# Patient Record
Sex: FEMALE | Race: WHITE
[De-identification: names, ages, dates, MRNs, and addresses within clinical notes are randomized per-mention and may not be internally consistent; named-entity substitution may affect disease eponyms.]

---

## 2017-01-23 ENCOUNTER — HOSPITAL ENCOUNTER (OUTPATIENT)
Dept: HOSPITAL 22 - RAD | Age: 42
End: 2017-01-23
Attending: FAMILY MEDICINE
Payer: COMMERCIAL

## 2017-01-23 DIAGNOSIS — R10.31: Primary | ICD-10-CM

## 2017-01-23 NOTE — RADIOLOGY REPORT PS360
US PELVIS-TRANSVAGINAL ONLY
 
ORDERING PHYSICIAN :  SHIRLENE Caputo MD
PATIENT AGE:  41 years  GENDER:  Female
 
INDICATION: PLQ PAIN
 RLQ pain vomiting diarrhea 1 day. Patient deposition.
 
TECHNIQUE: Transvaginal pelvic ultrasound
COMPARISON: Previous CT abdomen and pelvis 10/17/2013
 
FINDINGS 
 
UTERUS.: Normal size 6.4 cm length x2.8 x 3.6 cm wide. No fibroids. 
Tiny echogenic focus along endometrial stripe at body of uterus.- Nonspecific
may reflect small calcification or tiny polyp possibly.
Endometrial stripe however appears fairly normal measuring 4.5 mm AP but may
become slightly more generous towards cervix.. No fluid at cul-de-sac.
 
 
 Right Ovary 1.9 cm x 2.8  cm x 2 cm with transabdominal imaging. . Small
follicle 9 mm size. Vaguely evident
Difficult to visualize right ovary. Was scanned both transvaginal and trans
abdominal in order to survey right adnexa as well as right lower quadrant. Right
ovary Measurements are slightly larger with the transabdominal scanning versus
Tv.
 
Left Ovary larger than right
Left ovary: 2.9 cm x 1.8 cm x 1.53 cm
Follicular Cysts at left ovary: Largest measuring nearly 1.2 cm x 0.8 cm
posteriorly. Another more anteriorly measuring 1.1 cm 
 
 
IMPRESSION: -------------
 
Ovaries appear relatively small/normal size bilateral with bilateral follicular
cysts. 
More posterior right ovary more difficult to visualize
 
Right adnexa & RLQ image image with transvaginal and transabdominal scanning. 
No additional findings seen RLQ with limited ultrasound survey
 
No fluid in cul-de-sac more towards right adnexa.
 
 Uterus appears normal in size.
Small echogenic focus endometrium mid right uterus reflect small calcification.
Doubt tiny polyp

## 2017-01-23 NOTE — RADIOLOGY REPORT PS360
US ABD(COMPLETE-MULTI ORGANS**
 
ORDERING PHYSICIAN :  SHIRLENE Caputo MD
PATIENT AGE:  41 years  GENDER:  Female
 
INDICATION: PLQ PAIN
 Nausea and vomiting right lower quadrant pain
 
TECHNIQUE: ] Ultrasound abdomen, complete
 
COMPARISON: CT abdomen pelvis 10/17/2013
 
FINDINGS 
Pancreas unremarkable
 
Liver. No focal lesions. No bili ductal dilatation.
Portal vein normal direction flow. Normal size.
 
Gallbladder. Surgically absent
Common duct measuring up to 6 mm just inferior to the hilum of liver. Upper
normal caliber likely reflecting previous cholecystectomy changes.
 
Kidneys appear normal size and appearance bilaterally. Cortex well-maintained
bilateral
. No hydronephrosis nor mass. Either kidney
Right kidney. 9.6 cm length
Left kidney 9.8 cm length
 
Spleen. Normal size unremarkable. 9.5 cm length.
 
Aorta normal caliber. No aneurysm.
 It measures up to 1.6 cm proximal and then Tapers as it continues distal
 
IMPRESSION--------
 
Unremarkable ultrasound upper abdomen.
No abnormalities identified
 
Gallbladder surgically removed. 
Slight Generous common duct-just over 7 mm size reflecting previous
cholecystectomy
 
Kidneys unremarkable

## 2017-01-24 ENCOUNTER — HOSPITAL ENCOUNTER (EMERGENCY)
Dept: HOSPITAL 22 - ER | Age: 42
Discharge: HOME | End: 2017-01-24
Payer: COMMERCIAL

## 2017-01-24 VITALS — BODY MASS INDEX: 34.55 KG/M2 | WEIGHT: 215 LBS | HEIGHT: 66 IN

## 2017-01-24 VITALS — SYSTOLIC BLOOD PRESSURE: 128 MMHG | DIASTOLIC BLOOD PRESSURE: 74 MMHG

## 2017-01-24 DIAGNOSIS — I10: ICD-10-CM

## 2017-01-24 DIAGNOSIS — R19.7: Primary | ICD-10-CM

## 2017-01-24 DIAGNOSIS — E87.6: ICD-10-CM

## 2017-01-24 LAB
ALBUMIN/GLOB SERPL: (no result) {RATIO}
BASOPHILS # BLD AUTO: 1.2 K/MM3 (ref 0.7–4.5)
BUN: 18 MG/DL (ref 7–18)
EOSINOPHIL NFR BLD AUTO: 19.5 % (ref 10–50)
GFR SERPLBLD BASED ON 1.73 SQ M-ARVRAT: 69 ML/MIN (ref 59–?)
HCT VFR BLD CALC: 16 G/DL (ref 12.2–16.2)
SQUAMOUS #/AREA URNS HPF: (no result) #/HPF (ref 0–5)
URINE BILIRUBIN - DIPSTICK: NEGATIVE

## 2017-01-24 NOTE — EMERGENCY ROOM REPORT
History of Present Illness
Time Seen by MD [facility]
Presenting Problem in Triage
Pt arrived:Walked    
Presenting Problem:RIGHT SIDE PAIN WITH DIARRHEA FOR 48 HOURS 
Onset of symptoms date/time:01/2222/17/0800 or onset unknown for:
Treatment Prior to Arrival:    PTA Provided by:
 
Sepsis Risk Assessment: 
Temp: 98.4 B/P: 127/90 MAP: 102 Pulse: 138 Resp: 18
Recent fever? N Clinical Suspician of Infection? N 
Mental Status: 1 - Regular (Normal Baseline)   Sepsis Risk:Low Sepsis Risk 
 
Have you (or family members/close friends) recently traveled outside the [place]? N
If Yes, where/when: 
Have you had exposure to infectious disease within the past month? N TB? 
Other?  Specify: 
Watery stools for the last 2.5 days, started on Doxy for acne ten days ago. No 
fever. No blood from above or below. No sick contacts. Cramping in periumbilical
area and RUQ and RLQ with negative US done yesterday per PCP Dr. Caputo. LMP ten
years ago, on Depo. No vaginal discharge or bleeding; some dysuria. No flank 
pain. No flu sx. NPO since yesterday due to diminished appetite and cramping 
when trying to drink fluids. 
ALLERGIES
Coded Allergies:
Penicillins (04/11/16)
 
Home Medications
Active Scripts
NAPHAZOLINE HCL/PHENIRAMINE (Visine-A Eye Drops) 1 DROP OP 5XDAY 
     7 Days 
     Prov:      09/07/14
 
Reported Medications
Hyoscyamine Sulfate 0.125 MG SL DAILY 
     #60 
Doxycycline Hyclate 100 MG PO DAILY 
     #60 
Zolpidem Tartrate (Zolpidem Tartrate ER) 12.5 MG PO DAILY 
     #30 
Metoprolol Succinate Xl (Metoprolol ER 50MG*****) 50 MG PO DAILY 
     #90 
Loratadine 10 MG PO DAILY 
Rabeprazole Sodium (Aciphex 20Mg) 20 MG PO DAILY 
Metoprolol Tartrate (Metoprolol 25MG*****) 25 MG PO DAILY 
 
 
 
History
 
Medical History
General
   Angina: No
   MI: No
   Hypertension? Yes
   Hyperlipidemia? No
   CHF? No
   COPD? No
   Asthma? No
   CVA? No
   Seizures? No
   Diabetes? No
   GB Disease: No
   Arthritis? Yes
   MRSA? No
   TB? No
   Cancer? No
Immunization Hx
   DT/Tetanus NOT SURE
Surgical Hx
Previous Surgery?Y  SHOUDLER SURGERY   D & C   Exp.lap   RIGHT WRIST
HERNIATED DISC   CHOLEY           
            
 
GYN Hx
   LMP On Depo Med-LMP Unknown
 
Family History
Family Hx
   Diabetes Yes
   Hypertension Yes
   Hyperlipidemia Yes
   Cancer Yes
   TB Yes
 
Social History
Smoking Hx
   Smoker: Never Smoker
   Tobacco: No
Alcohol
   Alcohol: No
 
Review of Systems
All Other Systems Reviewed and Negative
Gastrointestinal
see HPI
Genitourinary
see HPI. 
 
Physical Exam
Vital Signs
 Vital Signs
 
 
Date Time Temp Pulse Resp B/P Pulse O2 O2 Flow FiO2
 
     Ox Delivery Rate 
 
01/24 1348  79 20 120/75 97   
 
01/24 1247  103 20 112/69 96   
 
01/24 1114 98.4 138 18 127/90 97   
 
 
General Appearance normal appearance, WD/WN, no apparent distress
Eye Exam
   -
   bilateral eye normal exam
Neck normal inspection, supple, full range of motion
Respiratory Status
Yes: trachea midline, chest symmetrical, non tender chest.  No: respiratory 
distress, tender on palpation, use of accessory muscles, pain on inspiration, 
pain on expiration, productive cough, non productive cough. 
Lung Sounds
bilateral: normal breath sounds, lungs clear. 
Cardiovascular normal exam, no peripheral edema, no gallop, no JVD, tachycardia
Gastrointestinal normal bowel sounds, soft, no organomegaly, no guarding, no 
rebound (minimally tender RUQ/RLQ), neg psoas and neg obturator signs
Neurologic alert, normal exam, no motor/sensory deficits
Skin intact, normal color
 
Medical Decision Making
 
LABS/Meds/Orders
Pt receiving controlled substance in ED? No
Results/Orders
 Laboratory Tests
 
 
01/24/17 1208:
Urine Color YELLOW, Urine Appearance SL CLOUDY, Urine pH 6.0, Ur Specific 
Gravity >= 1.030, Urine Protein 1+  H, Urine Ketones TRACE  H, Urine Blood 1+  H
, Urine Nitrate NEGATIVE, Urine Bilirubin NEGATIVE, Urine Urobilinogen 0.2, Ur 
Leukocyte Esterase NEGATIVE, Urine WBC 3-5, Ur Squamous Epith Cells 5-10, Urine 
Bacteria 3+, Fine Granular Casts OCC, Urine Mucus 4+, Urine Glucose NEGATIVE
 
01/24/17 1140:
Sodium 137, Potassium 3.0  L, Chloride 103, Carbon Dioxide 20  L, BUN 18, 
Creatinine 0.9, Estimated Creat Clear 127, Estimated GFR (MDRD) 69, Glucose 109 
H, Calcium 8.6, Total Bilirubin 0.4, AST 22, ALT 44, Alkaline Phosphatase 88, 
Total Protein 7.6, Albumin 3.8, Globulin 3.8  H, Albumin/Globulin Ratio 1.0  L, 
Lipase 63  L, WBC 6.0, RBC 5.38, Hgb 16.0, Hct 47.5  H, MCV 88.2, RDW 14.0, Plt 
Count 218, MPV 10.0, Gran % 69.5, Gran # 4.2, Lymphocytes % 19.5, Monocytes % 
7.9, Eosinophils % 2.6, Basophils % 0.4, Lymphocytes # 1.2, Monocytes # 0.5, 
Eosinophils # 0.2, Basophils # 0.0, PUBS MCHC 33.8, MCH 29.8
 Current Medication Orders
 
 
  Sig/Moira Start time  Last
 
Medication Dose Route Stop Time Status Admin
 
Potassium Chloride 0 .STK-MED ONE 01/24 1305 DC 
 
  .ROUTE   
 
Lactated Ringer's 1,000 ML .STK-MED ONE 01/24 1300 DC 
 
  IV   
 
Potassium Chloride 0 .STK-MED ONE 01/24 1300 DC 
 
  PO   
 
Ondansetron HCl 0 .STK-MED ONE 01/24 1259 DC 
 
  .ROUTE   
 
Ondansetron HCl 4 MG ONCE ONE 01/24 1215 DC 01/24
 
  IV 01/24 1216  1308
 
Potassium Chloride 20 MEQ ONCE ONE 01/24 1215 DC 01/24
 
  PO 01/24 1216  1307
 
Sodium Chloride 10 ML PRN PRN 01/24 1145 AC 
 
  IV 01/25 1131  
 
Lactated Ringer's 1,000 ML .STK-MED ONE 01/24 1143 DC 
 
  IV   
 
Lactated Ringer's 1,000 ML .Q1H1M 01/24 1130 DC 01/24
 
  IV 01/24 1230  1145
 
Sodium Chloride 10 ML PRN PRN 01/24 1130 AC 
 
  IV 01/25 1130  
 
 
 Orders
 
 
Procedure Date/time Status
 
DIET-NOTHING BY MOUTH 01/24 D Active
 
CULTURE, URINE 01/24 1208 Active
 
ELECTROCARDIOGRAM REQUEST 01/24 1202 Active
 
CT ABD/PELVIS REQ************* 01/24 1143 Active
 
IV SALINE LOCK 01/24 1131 Active
 
URINALYSIS/COMPLETE 01/24 1131 Complete
 
URINE PREGNANCY 01/24 1131 Complete
 
LIPASE 01/24 1131 Complete
 
CBC WITH AUTO DIFF 01/24 1131 Complete
 
CHEM 12 PROFILE 01/24 1131 Complete
 
12 LEAD EKG-ROBERT (INITIAL) 01/24  UNK Active
 
 
 
CM/EKG
CM/EKG
   EKG rate, NSR, rhythm, no ectopy, normal QRS, normal NE, normal EKG (slightly
flat T waves )
 
XRAY/CT/US
XRAY/CT/US
   CT abdomen, pelvis
   CT interpretation by reviewed by me (report reviewed)
   Time results known: 1332
   CT Results normal/NAD (neg acute per radiology)
 
Departure
 
Departure
Time of Disposition 1332
Disposition DC Home or Self Care(routine)
Clinical Impression
Primary Impression: Diarrhea
Qualifiers:  Diarrhea type: unspecified type Qualified Code: R19.7 - Diarrhea, 
unspecified
Secondary Impressions: Hypokalemia
Condition STABLE
Referrals
Nghia Smith MD (Family)
 
Patient Instructions Diarrhea
Additional Instructions
Stop Doxycycline; see Dr. Smith in two to five days, push Gatorade and other 
clear fluids
Discharge Counseling
   Counseled pt/family regarding diagnosis, test results, home care, follow up 
needs
ED Critical Care
   Critical Care No
 
Electronically Signed by Yojana Tucker MD on 01/24/17 at 1351

## 2017-01-24 NOTE — RADIOLOGY REPORT PS360
CT ABD   PELVIS W/O CONTRAST
 
CLINICAL INDICATION:    
DIMINISHED APPETITE FOR 3 DAYS,DIARRHEA,RUQ,RLQ ABD PAIN
ORDERING PHYSICIAN: Yojana Tucker MD
PATIENT AGE:  41 years
 
 
COMPARISON: None
 
TECHNIQUE: Axial images obtained with sagittal and coronal reformats.
 
PROCEDURE: 
Oral Contrast: None
IV Contrast: None .
 
FINDINGS:
 
Lower thorax: No acute finding 
 
ABDOMEN:
Liver: No masses or biliary dilatation.
Gallbladder: Status post cholecystectomy. No obvious biliary dilatation
Pancreas: No masses or peripancreatic fluid collections.
Spleen: Unremarkable.
Adrenals: Unremarkable
Kidneys/ureters: No masses. Nonobstructive right renal calculi. No
hydronephrosis. No perinephric fluid collections. No ureteral dilatation or
obvious ureteral calculi.
Stomach bowel: Nondistended. No obvious mass or thickening.
Appendix: No evidence of appendicitis.
 
PELVIS:
Reproductive: Unremarkable 
Bladder: Nondistended. No obvious stones or masses.
 
ABDOMEN & PELVIS: 
Peritoneum: No abnormal fluid collections. No obvious inflammatory changes. No
free air.
Lymph nodes: No enlarged lymph nodes apparent.
Vasculature: No evidence of abdominal aortic aneurysm. No retroperitoneal
hemorrhage evident.
Bones: No acute fracture
 
IMPRESSION: 
1. No acute intra-abdominal or pelvic pathology.
2. Prior cholecystectomy
3. Nonobstructing right renal calculus

## 2017-01-24 NOTE — EMERGENCY ROOM REPORT
History of Present Illness
Time Seen by MD [facility]
Presenting Problem in Triage
Pt arrived:Walked    
Presenting Problem:RIGHT SIDE PAIN WITH DIARRHEA FOR 48 HOURS 
Onset of symptoms date/time:01/2222/17/0800 or onset unknown for:
Treatment Prior to Arrival:    PTA Provided by:
 
Sepsis Risk Assessment: 
Temp: 98.4 B/P: 127/90 MAP: 102 Pulse: 138 Resp: 18
Recent fever? N Clinical Suspician of Infection? N 
Mental Status: 1 - Regular (Normal Baseline)   Sepsis Risk:Low Sepsis Risk 
 
Have you (or family members/close friends) recently traveled outside the [place]? N
If Yes, where/when: 
Have you had exposure to infectious disease within the past month? N TB? 
Other?  Specify: 
Watery stools for the last 2.5 days, started on Doxy for acne ten days ago. No 
fever. No blood from above or below. No sick contacts. Cramping in periumbilical
area and RUQ and RLQ with negative US done yesterday per PCP Dr. Caputo. LMP ten
years ago, on Depo. No vaginal discharge or bleeding; some dysuria. No flank 
pain. No flu sx. NPO since yesterday due to diminished appetite and cramping 
when trying to drink fluids. 
ALLERGIES
Coded Allergies:
Penicillins (04/11/16)
 
Home Medications
Active Scripts
NAPHAZOLINE HCL/PHENIRAMINE (Visine-A Eye Drops) 1 DROP OP 5XDAY 
     7 Days 
     Prov:      09/07/14
 
Reported Medications
Hyoscyamine Sulfate 0.125 MG SL DAILY 
     #60 
Doxycycline Hyclate 100 MG PO DAILY 
     #60 
Zolpidem Tartrate (Zolpidem Tartrate ER) 12.5 MG PO DAILY 
     #30 
Metoprolol Succinate Xl (Metoprolol ER 50MG*****) 50 MG PO DAILY 
     #90 
Loratadine 10 MG PO DAILY 
Rabeprazole Sodium (Aciphex 20Mg) 20 MG PO DAILY 
Metoprolol Tartrate (Metoprolol 25MG*****) 25 MG PO DAILY 
 
 
 
History
 
Medical History
General
   Angina: No
   MI: No
   Hypertension? Yes
   Hyperlipidemia? No
   CHF? No
   COPD? No
   Asthma? No
   CVA? No
   Seizures? No
   Diabetes? No
   GB Disease: No
   Arthritis? Yes
   MRSA? No
   TB? No
   Cancer? No
Immunization Hx
   DT/Tetanus NOT SURE
Surgical Hx
Previous Surgery?Y  SHOUDLER SURGERY   D & C   Exp.lap   RIGHT WRIST
HERNIATED DISC   CHOLEY           
            
 
GYN Hx
   LMP On Depo Med-LMP Unknown
 
Family History
Family Hx
   Diabetes Yes
   Hypertension Yes
   Hyperlipidemia Yes
   Cancer Yes
   TB Yes
 
Social History
Smoking Hx
   Smoker: Never Smoker
   Tobacco: No
Alcohol
   Alcohol: No
 
Review of Systems
All Other Systems Reviewed and Negative
Gastrointestinal
see HPI
Genitourinary
see HPI. 
 
Physical Exam
Vital Signs
 Vital Signs
 
 
Date Time Temp Pulse Resp B/P Pulse O2 O2 Flow FiO2
 
     Ox Delivery Rate 
 
01/24 1348  79 20 120/75 97   
 
01/24 1247  103 20 112/69 96   
 
01/24 1114 98.4 138 18 127/90 97   
 
 
General Appearance normal appearance, WD/WN, no apparent distress
Eye Exam
   -
   bilateral eye normal exam
Neck normal inspection, supple, full range of motion
Respiratory Status
Yes: trachea midline, chest symmetrical, non tender chest.  No: respiratory 
distress, tender on palpation, use of accessory muscles, pain on inspiration, 
pain on expiration, productive cough, non productive cough. 
Lung Sounds
bilateral: normal breath sounds, lungs clear. 
Cardiovascular normal exam, no peripheral edema, no gallop, no JVD, tachycardia
Gastrointestinal normal bowel sounds, soft, no organomegaly, no guarding, no 
rebound (minimally tender RUQ/RLQ), neg psoas and neg obturator signs
Neurologic alert, normal exam, no motor/sensory deficits
Skin intact, normal color
 
Medical Decision Making
 
LABS/Meds/Orders
Pt receiving controlled substance in ED? No
Results/Orders
 Laboratory Tests
 
 
01/24/17 1208:
Urine Color YELLOW, Urine Appearance SL CLOUDY, Urine pH 6.0, Ur Specific 
Gravity >= 1.030, Urine Protein 1+  H, Urine Ketones TRACE  H, Urine Blood 1+  H
, Urine Nitrate NEGATIVE, Urine Bilirubin NEGATIVE, Urine Urobilinogen 0.2, Ur 
Leukocyte Esterase NEGATIVE, Urine WBC 3-5, Ur Squamous Epith Cells 5-10, Urine 
Bacteria 3+, Fine Granular Casts OCC, Urine Mucus 4+, Urine Glucose NEGATIVE
 
01/24/17 1140:
Sodium 137, Potassium 3.0  L, Chloride 103, Carbon Dioxide 20  L, BUN 18, 
Creatinine 0.9, Estimated Creat Clear 127, Estimated GFR (MDRD) 69, Glucose 109 
H, Calcium 8.6, Total Bilirubin 0.4, AST 22, ALT 44, Alkaline Phosphatase 88, 
Total Protein 7.6, Albumin 3.8, Globulin 3.8  H, Albumin/Globulin Ratio 1.0  L, 
Lipase 63  L, WBC 6.0, RBC 5.38, Hgb 16.0, Hct 47.5  H, MCV 88.2, RDW 14.0, Plt 
Count 218, MPV 10.0, Gran % 69.5, Gran # 4.2, Lymphocytes % 19.5, Monocytes % 
7.9, Eosinophils % 2.6, Basophils % 0.4, Lymphocytes # 1.2, Monocytes # 0.5, 
Eosinophils # 0.2, Basophils # 0.0, PUBS MCHC 33.8, MCH 29.8
 Current Medication Orders
 
 
  Sig/Moira Start time  Last
 
Medication Dose Route Stop Time Status Admin
 
Potassium Chloride 0 .STK-MED ONE 01/24 1305 DC 
 
  .ROUTE   
 
Lactated Ringer's 1,000 ML .STK-MED ONE 01/24 1300 DC 
 
  IV   
 
Potassium Chloride 0 .STK-MED ONE 01/24 1300 DC 
 
  PO   
 
Ondansetron HCl 0 .STK-MED ONE 01/24 1259 DC 
 
  .ROUTE   
 
Ondansetron HCl 4 MG ONCE ONE 01/24 1215 DC 01/24
 
  IV 01/24 1216  1308
 
Potassium Chloride 20 MEQ ONCE ONE 01/24 1215 DC 01/24
 
  PO 01/24 1216  1307
 
Sodium Chloride 10 ML PRN PRN 01/24 1145 AC 
 
  IV 01/25 1131  
 
Lactated Ringer's 1,000 ML .STK-MED ONE 01/24 1143 DC 
 
  IV   
 
Lactated Ringer's 1,000 ML .Q1H1M 01/24 1130 DC 01/24
 
  IV 01/24 1230  1145
 
Sodium Chloride 10 ML PRN PRN 01/24 1130 AC 
 
  IV 01/25 1130  
 
 
 Orders
 
 
Procedure Date/time Status
 
DIET-NOTHING BY MOUTH 01/24 D Active
 
CULTURE, URINE 01/24 1208 Active
 
ELECTROCARDIOGRAM REQUEST 01/24 1202 Active
 
CT ABD/PELVIS REQ************* 01/24 1143 Active
 
IV SALINE LOCK 01/24 1131 Active
 
URINALYSIS/COMPLETE 01/24 1131 Complete
 
URINE PREGNANCY 01/24 1131 Complete
 
LIPASE 01/24 1131 Complete
 
CBC WITH AUTO DIFF 01/24 1131 Complete
 
CHEM 12 PROFILE 01/24 1131 Complete
 
12 LEAD EKG-ROBERT (INITIAL) 01/24  UNK Active
 
 
 
CM/EKG
CM/EKG
   EKG rate, NSR, rhythm, no ectopy, normal QRS, normal AR, normal EKG (slightly
flat T waves )
 
XRAY/CT/US
XRAY/CT/US
   CT abdomen, pelvis
   CT interpretation by reviewed by me (report reviewed)
   Time results known: 1332
   CT Results normal/NAD (neg acute per radiology)
 
Departure
 
Departure
Time of Disposition 1332
Disposition DC Home or Self Care(routine)
Clinical Impression
Primary Impression: Diarrhea
Qualifiers:  Diarrhea type: unspecified type Qualified Code: R19.7 - Diarrhea, 
unspecified
Secondary Impressions: Hypokalemia
Condition STABLE
Referrals
Nghia Smith MD (Family)
 
Patient Instructions Diarrhea
Additional Instructions
Stop Doxycycline; see Dr. Smith in two to five days, push Gatorade and other 
clear fluids
Discharge Counseling
   Counseled pt/family regarding diagnosis, test results, home care, follow up 
needs
ED Critical Care
   Critical Care No
 
Electronically Signed by Yojana Tucker MD on 01/24/17 at 1351

## 2017-01-25 ENCOUNTER — HOSPITAL ENCOUNTER (OUTPATIENT)
Dept: HOSPITAL 22 - LAB | Age: 42
End: 2017-01-25
Payer: COMMERCIAL

## 2017-01-25 DIAGNOSIS — R19.7: Primary | ICD-10-CM

## 2017-01-25 LAB
AEROMONAS: NOT DETECTED
C CAYETANENSIS DNA SPEC QL NAA+PROBE: NOT DETECTED
E COLI O157H7 SPEC CULT: NOT DETECTED
EAEC PAA PLAS AGGR+AATA ST NAA+NON-PRB: NOT DETECTED
ENTEROAGGREGATIVE E COLI: NOT DETECTED
ENTEROTOXIGENIC E COLI: NOT DETECTED
HASTV RNA SPEC QL NAA+PROBE: NOT DETECTED
NOROVIRUS: DETECTED
SAPOVIRUS RNA SPEC QL NAA+PROBE: NOT DETECTED
SHIGA-LIKE TOXIN PROD. E COLI: NOT DETECTED
SHIGELLA SP+EIEC IPAH ST NAA+NON-PROBE: NOT DETECTED
V CHOLERAE DNA SPEC QL NAA+PROBE: NOT DETECTED

## 2017-10-24 ENCOUNTER — HOSPITAL ENCOUNTER (EMERGENCY)
Dept: HOSPITAL 22 - ER | Age: 42
Discharge: HOME | End: 2017-10-24
Payer: COMMERCIAL

## 2017-10-24 VITALS — SYSTOLIC BLOOD PRESSURE: 137 MMHG | DIASTOLIC BLOOD PRESSURE: 80 MMHG

## 2017-10-24 VITALS — HEIGHT: 66 IN | BODY MASS INDEX: 29.73 KG/M2 | WEIGHT: 185 LBS

## 2017-10-24 DIAGNOSIS — I10: ICD-10-CM

## 2017-10-24 DIAGNOSIS — F41.9: ICD-10-CM

## 2017-10-24 DIAGNOSIS — Z88.0: ICD-10-CM

## 2017-10-24 DIAGNOSIS — R56.9: Primary | ICD-10-CM

## 2017-10-24 LAB
ALBUMIN/GLOB SERPL: (no result) {RATIO}
AMPHETAMINES/METAMPHETAMINES: POSITIVE NG/ML (ref ?–1000)
BASOPHILS # BLD AUTO: 3 K/MM3 (ref 0.7–4.5)
BUN: 20 MG/DL (ref 7–18)
EOSINOPHIL NFR BLD AUTO: 25.5 % (ref 10–50)
GFR SERPLBLD BASED ON 1.73 SQ M-ARVRAT: 45 ML/MIN (ref 59–?)
HCT VFR BLD CALC: 15.2 G/DL (ref 12.2–16.2)
SQUAMOUS #/AREA URNS HPF: (no result) #/HPF (ref 0–5)
URINE BILIRUBIN - DIPSTICK: NEGATIVE

## 2017-10-24 NOTE — EMERGENCY ROOM REPORT
History of Present Illness
Time Seen by MD [id number]
Presenting Problem in Triage
Pt arrived:    
Presenting Problem: 
Onset of symptoms date/time:/ or onset unknown for:
Treatment Prior to Arrival:    PTA Provided by:
 
Sepsis Risk Assessment: 
Temp:  B/P:  MAP:  Pulse:  Resp: 
Recent fever?  Clinical Suspician of Infection?  
Mental Status:    Sepsis Risk: 
 
Have you (or family members/close friends) recently traveled outside the [place] 
[place]? 
If Yes, where/when: 
Have you had exposure to infectious disease within the past month?  TB? 
Other?  Specify: 
 
Source patient, RN notes reviewed, family, RN/MD
Exam Limitations no limitations
Comment
This is a 42-year-old  feel patient brought in by EMS after having a 
witnessed epileptic episode, described by her  has a tonicoclonic 
seizure. Patient is postictally confused upon arrival in the emergency room, 
unaware of date, location, purpose of her ER visit.  denies any urinary 
incontinence or biting of the tongue.
According to the  the patient has been seeing a psychiatrist in 
[place], Dr. Hermila Avelar, who has been treating her for bipolar disorder and 
anxiety. Her psychotropic medications have been frequently changed in the past 
few months, as patient has been resistant to different treatments.
Hospital advised the patient is also taking a deep packs for weight loss.
He was able to bring her medications from home which at this time proved to be 
Ambien, Wellbutrin  mg, Adipex, AcipHex.
Review of patient's medical records revealed that she has also been on Lamictal 
just a few months ago, Abilify and Cymbalta.
 
ALLERGIES
Coded Allergies:
Penicillins (04/11/16)
 
Home Medications
Active Scripts
Ibuprofen (Ibuprofen 800MG*****) 800 MG PO QIDP PRN pain
     #30 TAB 
     Prov:      05/22/17
 
Reported Medications
Doxycycline Hyclate 100 MG PO DAILY 
     #60 
Metoprolol Succinate Xl (Metoprolol ER 50MG*****) 50 MG PO DAILY 
     #90 
Loratadine 10 MG PO DAILY 
Rabeprazole Sodium (Aciphex 20Mg) 20 MG PO DAILY 
DULOXETINE HCL (Cymbalta 60MG*****) 60 MG PO DAILY 
 
 
 
History
 
Medical History
General
   CAD? No
   Angina: No
   MI: No
   Hypertension? Yes
   Hyperlipidemia? No
   CHF? No
   DVT? No
   PE? No
   COPD? No
   Asthma? No
   Anemia? No
   GERD? No
   Gastric ulcers? No
   GI Bleed? No
   Hernia? No
   Thyroid Problems? No
   Hypothyroidism? No
   CVA? No
   Seizures? No
   Diabetes? No
   Renal Insuffiency? No
   End Stage Renal Disease? No
   UTI? No
   Stones? No
   BPH? No
   GB Disease: No
   Nephritic Syndrome? No
   Asplenia? No
   Hepatitis? No
   Sickle Cell Disease? No
   Arthritis? Yes
   Migraines? No
   Cataracts? No
   Glaucoma? No
   MRSA? No
   HIV? No
   TB? No
   Anxiety? No
   Depression? No
   Cancer? No
Immunization Hx
   DT/Tetanus NOT SURE
Surgical Hx
Previous Surgery?Y  SHOUDLER SURGERY   D & C   Exp.lap   RIGHT WRIST
HERNIATED DISC   CHOLEY   L FOOT        
            
 
 
Family History
Family Hx
   Diabetes Yes
   Hypertension Yes
   Hyperlipidemia Yes
   Cancer Yes
   TB Yes
 
Social History
Alcohol
   Alcohol: No
 
Review of Systems
All Other Systems Reviewed and Negative
Psychiatric/Neurological
see HPI, seizure
 
Physical Exam
Vital Signs
 Vital Signs
 
 
Date Time Temp Pulse Resp B/P Pulse O2 O2 Flow FiO2
 
     Ox Delivery Rate 
 
10/24 2147 97.7 94 16 137/80 100   
 
10/24 2045 97.7 94 16 137/80 100   
 
10/24 1935  96 16 128/80 96   
 
10/24 1907 97.7 103 22 127/89 99   
 
 
General Appearance normal appearance, WD/WN, mild distress
Eye Exam
   -
   bilateral eye normal exam, bilateral eye PERRL, bilateral eye EOMI, bilateral
eye other (normal fundi)
Neck normal inspection, non-tender, supple, full range of motion
Respiratory Status
Yes: trachea midline, chest symmetrical, non tender chest.  No: respiratory 
distress. 
Lung Sounds
bilateral: normal breath sounds, lungs clear. 
Cardiovascular normal exam, regular rate/rhythm, no peripheral edema, no gallop,
no JVD, no murmur, no rub, normal peripheral pulses
Gastrointestinal normal bowel sounds, normal exam, non tender, soft, no 
organomegaly
Extremities non-tender, normal range of motion, normal inspection
Neurologic alert, CNs II-XII nml as tested, normal exam, no motor/sensory 
deficits, disoriented x 3
Glascow Coma Scale
 
 
Glascow Coma Scale Response Value
 
EYE response: 4 Spontaneously 4
 
MOTOR response: 6 OBEYS 6
 
VERBAL response: 5 Oriented & Converses 5
 
Total   15
 
 
Mental status depressed affect
Skin intact, normal color, warm/dry
 
Medical Decision Making
 
LABS/Meds/Orders
Pt receiving controlled substance in ED? No
Comment
1800 - patient appears medically stable, clinically improving, more alert and 
not also oriented 3. 
18:10-case discussed with patient's neurologist, Dr. Avelar in [place], who 
confirmed the patient has been seeing him for anxiety as well as bipolar 
disorder and that she has been tried on multiple medications recently including 
Lamictal, Abilify, and most recently Wellbutrin and Cymbalta. Patient has took 
upon herself to discontinue the Abilify, stating that "is not helping".  The 
psychiatrist was unaware that the patient has been taking Adipex simultaneously 
with his psychotropic medications.
However after finding out the patient has been taking Adipex, the psychiatrist 
stated that he is now explaining all her symptoms. 
Dr. Avelar recommended patient to also see the neurologist, Dr. Tessa Swann, in 
[place] upon discharge from the emergency room.
 
18:30 -she continues to improve, she is now ambulatory to the bathroom, AAOx3.  
Advised patient that she would immediately need to discontinue the Wellbutrin as
well as the Adipex, also keep her appointment for Thursday, October 26 at 5 PM 
with Dr. Avelar, and also schedule follow-up appointment with Dr. Tessa Swann, 
urologist, in [place].
Results/Orders
 Laboratory Tests
 
 
10/24/17 1930:
Opiates Screen NEGATIVE, Urine Methadone Screen NEGATIVE, Barbiturates NEGATIVE,
Phencyclidine Screen NEGATIVE, Amphetamines Screen POSITIVE  H, Benzodiazepines 
Screen NEGATIVE, Cocaine Screen NEGATIVE, Marijuana (THC) Screen NEGATIVE, Urine
Color YELLOW, Urine Appearance CLEAR, Urine pH 5.5, Ur Specific Gravity >= 1.030
, Urine Protein 1+  H, Urine Ketones NEGATIVE, Urine Blood 1+  H, Urine Nitrate 
NEGATIVE, Urine Bilirubin NEGATIVE, Urine Urobilinogen 0.2, Ur Leukocyte 
Esterase NEGATIVE, Urine RBC OCC, Urine WBC 3-5, Ur Squamous Epith Cells 3-5, 
Urine Bacteria 2+, Hyaline Casts 20-50, Urine Mucus 2+, Urine Glucose NEGATIVE
 
10/24/17 1900:
Sodium 139, Potassium 3.3  L, Chloride 101, Carbon Dioxide 17  L, BUN 20  H, 
Creatinine 1.3  H, Estimated Creat Clear 75, Estimated GFR (MDRD) 45  L, Glucose
121  H, Calcium 9.7, Total Bilirubin 0.5, AST 14  L, ALT 25, Alkaline 
Phosphatase 103, Creatine Kinase 108, CK-MB (CK-2) Rel Index 1.3, CK and CKMB 
Interp 1.4, Troponin I < 0.02, Total Protein 8.1, Albumin 4.5, Globulin 3.6  H, 
Albumin/Globulin Ratio 1.3, APTT 24.4, WBC 11.5  H, RBC 5.10, Hgb 15.2, Hct 46.4
, MCV 91.0, RDW 12.2, Plt Count 277, MPV 9.1, Gran % 67.6, Gran # 7.8, 
Lymphocytes % 25.5, Monocytes % 5.9, Eosinophils % 0.6, Basophils % 0.5, 
Lymphocytes # 3.0, Monocytes # 0.7, Eosinophils # 0.1, Basophils # 0.1, PUBS 
MCHC 32.6, MCH 29.7, Salicylates 0.8  L, Acetaminophen 0  L, Alcohols 0
 Current Medication Orders
 
 
  Sig/Moira Start time  Last
 
Medication Dose Route Stop Time Status Admin
 
Acetaminophen 0 .STK-MED ONE 10/24 2143 DC 
 
  PO   
 
Acetaminophen 0 .STK-MED ONE 10/24 2133 DC 
 
  PO   
 
Acetaminophen 1,000 MG ONCE ONE 10/24 2130 DC 10/24
 
  PO 10/24 2131  2144
 
Sodium Chloride 1,000 ML .Q1H1M 10/24 1945 DC 10/24
 
  IV 10/24 2045  2010
 
Sodium Chloride 10 ML PRN PRN 10/24 1945 DCD 
 
  IV 10/25 1936  
 
Sodium Chloride 1,000 ML .Q1H1M 10/24 1930 DC 10/24
 
  IV 10/24 2030  1924
 
Sodium Chloride 10 ML PRN PRN 10/24 1930 DCD 
 
  IV 10/25 1918  
 
Sodium Chloride 1,000 ML .STK-MED ONE 10/24 1922 DC 
 
  IV   
 
 
 Orders
 
 
Procedure Date/time Status
 
DIET-NOTHING BY MOUTH 10/25 B Active
 
ELECTROCARDIOGRAM REQUEST 10/24 2008 Active
 
CULTURE, URINE 10/24 1930 Active
 
DRUG ABUSE SCREEN (10) 10/24 1853 Complete
 
CHEST-PORTABLE**************** 10/24 1852 Active
 
SALICYLATE 10/24 1852 Complete
 
ALCOHOL 10/24 1852 Complete
 
Acetaminophen 10/24 1852 Complete
 
CT HEAD W/O CONTRAST 10/24 1851 Active
 
URINALYSIS/COMPLETE 10/24 1851 Complete
 
PARTIAL THROMBOPLASTIN TIME 10/24 1851 Complete
 
URINE PREGNANCY 10/24 1851 Complete
 
CBC WITH AUTO DIFF 10/24 1851 Complete
 
CARDIAC ENZYMES 10/24 1851 Complete
 
CHEM 12 PROFILE 10/24 1851 Complete
 
CT HEAD REQ*************** 10/24 1849 Complete
 
12 LEAD EKG-ROBERT (INITIAL) 10/24  UNK Active
 
 
 
CM/EKG
CM/EKG
   Monitor Rhythm Normal Sinus Rhythm
   Rate 85
   Ectopy No
   Comments
No acute ischemic changes
   EKG rate, NSR, rhythm, no evid. of ischemic chgs, no ectopy, normal QRS, 
normal RI, normal EKG, no EKG for comparison, non-spec. ST/Twave chgs, ST 
elevation, ST depression, LBBB, RBBB, ectopy, abnormal Q waves
 
XRAY/CT/US
XRAY/CT/US
   CT head
   CT interpretation by reviewed by me, discussed w/radiologist
   CT Results normal/NAD (no ICH), no fracture seen
 
Departure
 
Departure
Time of Disposition 1908
Disposition DC Home or Self Care(routine)
Clinical Impression
Primary Impression: Drug-induced seizure
Condition STABLE
Referrals
HERMILA AVELAR
on Thursday 10/26/17 at 5pm. 
 
TESSA SWANN
Please call the office in the morning and request a follow-up appointment within
the next 2 days.
 
Patient Instructions DI for Adverse Drug Reaction -- Other, DI for Seizure 
Disorder -- Adult
Additional Instructions
You're epileptic episode tonight appears to have been triggered by a combination
of two medications that you are currently on:  ADIPEX and WELLBUTRIN (Buproprion
).  Please discontinue both immediately.
 
Please do not climb on heights, operate machinery, drive, till seen and 
evaluated by both the psychiatrist, Dr. Avelar, as well as the neurologist, Dr. Swann.
 
Please have somebody watch her closely over the next 24 hours, for any possible,
recurrent seizures.
 
 
Discharge Counseling
   Counseled pt/family regarding diagnosis, test results, medications/RX, home 
care, follow up needs
   Comment
You're epileptic episode tonight appears to have been triggered by a combination
of two medications that you are currently on:  ADIPEX and WELLBUTRIN (Buproprion
).  Please discontinue both immediately.
 
Please do not climb on heights, operate machinery, drive, till seen and 
evaluated by both the psychiatrist, Dr. Avelar, as well as the neurologist, Dr. Swann.
 
Please have somebody watch her closely over the next 24 hours, for any possible,
recurrent seizures.
ED Critical Care
   Critical Care No
 
Electronically Signed by Amy CRUZ,Kevin HOPKINS on 10/25/17 at 0025

## 2017-10-24 NOTE — EXTERNAL MEDICAL SUMMARY RPT
[facility]
 Created on: 10/24/2017
 
JAMESON MOORE
External Reference #: [id number]
: 75
Sex: Female
 
Demographics
 
 
 
 Address  [address] [place] [address]
 
          
 
  [place]  [address]
 
 Home Phone  [phone]
 
 Preferred Language  English
 
 Marital Status  Unknown
 
 Judaism Affiliation  Unknown
 
 Race  Unknown
 
 Ethnic Group  Unknown
 
 
Author
 
 
 
 Author            [facility]
 
 Address  Unknown
 
 Phone  [email or web address]
 
                                            
 
Purpose
                      Continuity of Care Document - 1900 through 10-24-
2017

## 2017-10-24 NOTE — EXTERNAL MEDICAL SUMMARY RPT
[facility]
 Created on: 10/24/2017
 
JAMESON MOORE
: 75
Sex: Undifferentiated
 
Demographics
 
 
 
 Preferred Language  English
 
 Marital Status  Unknown
 
 Druze Affiliation  Unknown
 
 Race  Unknown
 
 Ethnic Group  Unknown
 
 
Author
 
 
 
 Author            [facility]
 
 Address  Unknown
 
 Phone  [email or web address]
 
                                                                
 
Immunization
                                    No patient found.

## 2017-10-24 NOTE — EXTERNAL MEDICAL SUMMARY RPT
[facility]
 Created on: 10/24/2017
 
JAMESON MOORE
External Reference #: [id number]
: 75
Sex: Female
 
Demographics
 
 
 
 Address  [address] [place] [address]
 
          
 
  [place]  [address]
 
 Home Phone  [phone]
 
 Preferred Language  English
 
 Marital Status  Unknown
 
 Yazidism Affiliation  Unknown
 
 Race  Unknown
 
 Ethnic Group  Unknown
 
 
Author
 
 
 
 Author            [facility]
 
 Address  Unknown
 
 Phone  [email or web address]
 
                                            
 
Purpose
                      Continuity of Care Document - 1900 through 10-24-
2017

## 2017-10-24 NOTE — EXTERNAL MEDICAL SUMMARY RPT
[facility] [employer] [facility]
 Created on: 10/24/2017
 
JAMESON MOORE
External Reference #: [id number]
: 75
Sex: Female
 
Demographics
 
 
 
 Address  [address] [place] [facility] [address]
 
          
 
  [facility] [place]  [address]
 
 Preferred Language  English
 
 Marital Status  Unknown
 
 Jainism Affiliation  Unknown
 
 Race  W
 
 Ethnic Group  Unknown
 
 
Author
 
 
 
 Author  [employer]
 
 Organization  [employer]
 
 Address  Unknown
 
 Phone  Unavailable
 
                                            
 
Purpose
                      Continuity of Care Document - 1900 through 10-24-
2017

## 2017-10-24 NOTE — EXTERNAL MEDICAL SUMMARY RPT
[facility] [employer] [facility]
 Created on: 10/24/2017
 
JAMEOSN MOORE
External Reference #: [id number]
: 75
Sex: Female
 
Demographics
 
 
 
 Address  [address] [place] [facility] [address]
 
          
 
  [facility] [place]  [address]
 
 Preferred Language  English
 
 Marital Status  Unknown
 
 Anabaptism Affiliation  Unknown
 
 Race  W
 
 Ethnic Group  Unknown
 
 
Author
 
 
 
 Author  [employer]
 
 Organization  [employer]
 
 Address  Unknown
 
 Phone  Unavailable
 
                                            
 
Purpose
                      Continuity of Care Document - 1900 through 10-24-
2017

## 2017-10-24 NOTE — EXTERNAL MEDICAL SUMMARY RPT
[facility] Patient Summary
 Created on: 10/24/2017
 
JMAESON MOORE
External Reference #: [id number]
: 75
Sex: Female
 
Demographics
 
 
 
 Address  [address] [place] [address] [place]  [address]
 
 Home Phone  [phone]
 
 Preferred Language  Unknown
 
 Marital Status  Unknown
 
 Jehovah's witness Affiliation  Unknown
 
 Race  Unknown
 
 Ethnic Group  Unknown
 
 
Author
 
 
 
 Author  [facility] Lauren, [facility] Lauren 
 
 Organization  [employer] [employer]
 
 Address  Unknown
 
 Phone  Unavailable

## 2017-10-24 NOTE — EXTERNAL MEDICAL SUMMARY RPT
[facility]
 Created on: 10/24/2017
 
JAMESON MOORE
: 75
Sex: Undifferentiated
 
Demographics
 
 
 
 Preferred Language  English
 
 Marital Status  Unknown
 
 Rastafari Affiliation  Unknown
 
 Race  Unknown
 
 Ethnic Group  Unknown
 
 
Author
 
 
 
 Author            [facility]
 
 Address  Unknown
 
 Phone  [email or web address]
 
                                                                
 
Immunization
                                    No patient found.

## 2017-10-24 NOTE — EMERGENCY ROOM REPORT
History of Present Illness
Time Seen by MD [id number]
Presenting Problem in Triage
Pt arrived:    
Presenting Problem: 
Onset of symptoms date/time:/ or onset unknown for:
Treatment Prior to Arrival:    PTA Provided by:
 
Sepsis Risk Assessment: 
Temp:  B/P:  MAP:  Pulse:  Resp: 
Recent fever?  Clinical Suspician of Infection?  
Mental Status:    Sepsis Risk: 
 
Have you (or family members/close friends) recently traveled outside the [place] 
[place]? 
If Yes, where/when: 
Have you had exposure to infectious disease within the past month?  TB? 
Other?  Specify: 
 
Source patient, RN notes reviewed, family, RN/MD
Exam Limitations no limitations
Comment
This is a 42-year-old  feel patient brought in by EMS after having a 
witnessed epileptic episode, described by her  has a tonicoclonic 
seizure. Patient is postictally confused upon arrival in the emergency room, 
unaware of date, location, purpose of her ER visit.  denies any urinary 
incontinence or biting of the tongue.
According to the  the patient has been seeing a psychiatrist in 
[place], Dr. Hermila Avelar, who has been treating her for bipolar disorder and 
anxiety. Her psychotropic medications have been frequently changed in the past 
few months, as patient has been resistant to different treatments.
Hospital advised the patient is also taking a deep packs for weight loss.
He was able to bring her medications from home which at this time proved to be 
Ambien, Wellbutrin  mg, Adipex, AcipHex.
Review of patient's medical records revealed that she has also been on Lamictal 
just a few months ago, Abilify and Cymbalta.
 
ALLERGIES
Coded Allergies:
Penicillins (04/11/16)
 
Home Medications
Active Scripts
Ibuprofen (Ibuprofen 800MG*****) 800 MG PO QIDP PRN pain
     #30 TAB 
     Prov:      05/22/17
 
Reported Medications
Doxycycline Hyclate 100 MG PO DAILY 
     #60 
Metoprolol Succinate Xl (Metoprolol ER 50MG*****) 50 MG PO DAILY 
     #90 
Loratadine 10 MG PO DAILY 
Rabeprazole Sodium (Aciphex 20Mg) 20 MG PO DAILY 
DULOXETINE HCL (Cymbalta 60MG*****) 60 MG PO DAILY 
 
 
 
History
 
Medical History
General
   CAD? No
   Angina: No
   MI: No
   Hypertension? Yes
   Hyperlipidemia? No
   CHF? No
   DVT? No
   PE? No
   COPD? No
   Asthma? No
   Anemia? No
   GERD? No
   Gastric ulcers? No
   GI Bleed? No
   Hernia? No
   Thyroid Problems? No
   Hypothyroidism? No
   CVA? No
   Seizures? No
   Diabetes? No
   Renal Insuffiency? No
   End Stage Renal Disease? No
   UTI? No
   Stones? No
   BPH? No
   GB Disease: No
   Nephritic Syndrome? No
   Asplenia? No
   Hepatitis? No
   Sickle Cell Disease? No
   Arthritis? Yes
   Migraines? No
   Cataracts? No
   Glaucoma? No
   MRSA? No
   HIV? No
   TB? No
   Anxiety? No
   Depression? No
   Cancer? No
Immunization Hx
   DT/Tetanus NOT SURE
Surgical Hx
Previous Surgery?Y  SHOUDLER SURGERY   D & C   Exp.lap   RIGHT WRIST
HERNIATED DISC   CHOLEY   L FOOT        
            
 
 
Family History
Family Hx
   Diabetes Yes
   Hypertension Yes
   Hyperlipidemia Yes
   Cancer Yes
   TB Yes
 
Social History
Alcohol
   Alcohol: No
 
Review of Systems
All Other Systems Reviewed and Negative
Psychiatric/Neurological
see HPI, seizure
 
Physical Exam
Vital Signs
 Vital Signs
 
 
Date Time Temp Pulse Resp B/P Pulse O2 O2 Flow FiO2
 
     Ox Delivery Rate 
 
10/24 2147 97.7 94 16 137/80 100   
 
10/24 2045 97.7 94 16 137/80 100   
 
10/24 1935  96 16 128/80 96   
 
10/24 1907 97.7 103 22 127/89 99   
 
 
General Appearance normal appearance, WD/WN, mild distress
Eye Exam
   -
   bilateral eye normal exam, bilateral eye PERRL, bilateral eye EOMI, bilateral
eye other (normal fundi)
Neck normal inspection, non-tender, supple, full range of motion
Respiratory Status
Yes: trachea midline, chest symmetrical, non tender chest.  No: respiratory 
distress. 
Lung Sounds
bilateral: normal breath sounds, lungs clear. 
Cardiovascular normal exam, regular rate/rhythm, no peripheral edema, no gallop,
no JVD, no murmur, no rub, normal peripheral pulses
Gastrointestinal normal bowel sounds, normal exam, non tender, soft, no 
organomegaly
Extremities non-tender, normal range of motion, normal inspection
Neurologic alert, CNs II-XII nml as tested, normal exam, no motor/sensory 
deficits, disoriented x 3
Glascow Coma Scale
 
 
Glascow Coma Scale Response Value
 
EYE response: 4 Spontaneously 4
 
MOTOR response: 6 OBEYS 6
 
VERBAL response: 5 Oriented & Converses 5
 
Total   15
 
 
Mental status depressed affect
Skin intact, normal color, warm/dry
 
Medical Decision Making
 
LABS/Meds/Orders
Pt receiving controlled substance in ED? No
Comment
1800 - patient appears medically stable, clinically improving, more alert and 
not also oriented 3. 
18:10-case discussed with patient's neurologist, Dr. Avelar in [place], who 
confirmed the patient has been seeing him for anxiety as well as bipolar 
disorder and that she has been tried on multiple medications recently including 
Lamictal, Abilify, and most recently Wellbutrin and Cymbalta. Patient has took 
upon herself to discontinue the Abilify, stating that "is not helping".  The 
psychiatrist was unaware that the patient has been taking Adipex simultaneously 
with his psychotropic medications.
However after finding out the patient has been taking Adipex, the psychiatrist 
stated that he is now explaining all her symptoms. 
Dr. Avelar recommended patient to also see the neurologist, Dr. Tessa Swann, in 
[place] upon discharge from the emergency room.
 
18:30 -she continues to improve, she is now ambulatory to the bathroom, AAOx3.  
Advised patient that she would immediately need to discontinue the Wellbutrin as
well as the Adipex, also keep her appointment for Thursday, October 26 at 5 PM 
with Dr. Avelar, and also schedule follow-up appointment with Dr. Tessa Swann, 
urologist, in [place].
Results/Orders
 Laboratory Tests
 
 
10/24/17 1930:
Opiates Screen NEGATIVE, Urine Methadone Screen NEGATIVE, Barbiturates NEGATIVE,
Phencyclidine Screen NEGATIVE, Amphetamines Screen POSITIVE  H, Benzodiazepines 
Screen NEGATIVE, Cocaine Screen NEGATIVE, Marijuana (THC) Screen NEGATIVE, Urine
Color YELLOW, Urine Appearance CLEAR, Urine pH 5.5, Ur Specific Gravity >= 1.030
, Urine Protein 1+  H, Urine Ketones NEGATIVE, Urine Blood 1+  H, Urine Nitrate 
NEGATIVE, Urine Bilirubin NEGATIVE, Urine Urobilinogen 0.2, Ur Leukocyte 
Esterase NEGATIVE, Urine RBC OCC, Urine WBC 3-5, Ur Squamous Epith Cells 3-5, 
Urine Bacteria 2+, Hyaline Casts 20-50, Urine Mucus 2+, Urine Glucose NEGATIVE
 
10/24/17 1900:
Sodium 139, Potassium 3.3  L, Chloride 101, Carbon Dioxide 17  L, BUN 20  H, 
Creatinine 1.3  H, Estimated Creat Clear 75, Estimated GFR (MDRD) 45  L, Glucose
121  H, Calcium 9.7, Total Bilirubin 0.5, AST 14  L, ALT 25, Alkaline 
Phosphatase 103, Creatine Kinase 108, CK-MB (CK-2) Rel Index 1.3, CK and CKMB 
Interp 1.4, Troponin I < 0.02, Total Protein 8.1, Albumin 4.5, Globulin 3.6  H, 
Albumin/Globulin Ratio 1.3, APTT 24.4, WBC 11.5  H, RBC 5.10, Hgb 15.2, Hct 46.4
, MCV 91.0, RDW 12.2, Plt Count 277, MPV 9.1, Gran % 67.6, Gran # 7.8, 
Lymphocytes % 25.5, Monocytes % 5.9, Eosinophils % 0.6, Basophils % 0.5, 
Lymphocytes # 3.0, Monocytes # 0.7, Eosinophils # 0.1, Basophils # 0.1, PUBS 
MCHC 32.6, MCH 29.7, Salicylates 0.8  L, Acetaminophen 0  L, Alcohols 0
 Current Medication Orders
 
 
  Sig/Moira Start time  Last
 
Medication Dose Route Stop Time Status Admin
 
Acetaminophen 0 .STK-MED ONE 10/24 2143 DC 
 
  PO   
 
Acetaminophen 0 .STK-MED ONE 10/24 2133 DC 
 
  PO   
 
Acetaminophen 1,000 MG ONCE ONE 10/24 2130 DC 10/24
 
  PO 10/24 2131  2144
 
Sodium Chloride 1,000 ML .Q1H1M 10/24 1945 DC 10/24
 
  IV 10/24 2045  2010
 
Sodium Chloride 10 ML PRN PRN 10/24 1945 DCD 
 
  IV 10/25 1936  
 
Sodium Chloride 1,000 ML .Q1H1M 10/24 1930 DC 10/24
 
  IV 10/24 2030  1924
 
Sodium Chloride 10 ML PRN PRN 10/24 1930 DCD 
 
  IV 10/25 1918  
 
Sodium Chloride 1,000 ML .STK-MED ONE 10/24 1922 DC 
 
  IV   
 
 
 Orders
 
 
Procedure Date/time Status
 
DIET-NOTHING BY MOUTH 10/25 B Active
 
ELECTROCARDIOGRAM REQUEST 10/24 2008 Active
 
CULTURE, URINE 10/24 1930 Active
 
DRUG ABUSE SCREEN (10) 10/24 1853 Complete
 
CHEST-PORTABLE**************** 10/24 1852 Active
 
SALICYLATE 10/24 1852 Complete
 
ALCOHOL 10/24 1852 Complete
 
Acetaminophen 10/24 1852 Complete
 
CT HEAD W/O CONTRAST 10/24 1851 Active
 
URINALYSIS/COMPLETE 10/24 1851 Complete
 
PARTIAL THROMBOPLASTIN TIME 10/24 1851 Complete
 
URINE PREGNANCY 10/24 1851 Complete
 
CBC WITH AUTO DIFF 10/24 1851 Complete
 
CARDIAC ENZYMES 10/24 1851 Complete
 
CHEM 12 PROFILE 10/24 1851 Complete
 
CT HEAD REQ*************** 10/24 1849 Complete
 
12 LEAD EKG-ROBERT (INITIAL) 10/24  UNK Active
 
 
 
CM/EKG
CM/EKG
   Monitor Rhythm Normal Sinus Rhythm
   Rate 85
   Ectopy No
   Comments
No acute ischemic changes
   EKG rate, NSR, rhythm, no evid. of ischemic chgs, no ectopy, normal QRS, 
normal WA, normal EKG, no EKG for comparison, non-spec. ST/Twave chgs, ST 
elevation, ST depression, LBBB, RBBB, ectopy, abnormal Q waves
 
XRAY/CT/US
XRAY/CT/US
   CT head
   CT interpretation by reviewed by me, discussed w/radiologist
   CT Results normal/NAD (no ICH), no fracture seen
 
Departure
 
Departure
Time of Disposition 1908
Disposition DC Home or Self Care(routine)
Clinical Impression
Primary Impression: Drug-induced seizure
Condition STABLE
Referrals
HERMILA AVELAR
on Thursday 10/26/17 at 5pm. 
 
TESSA SWANN
Please call the office in the morning and request a follow-up appointment within
the next 2 days.
 
Patient Instructions DI for Adverse Drug Reaction -- Other, DI for Seizure 
Disorder -- Adult
Additional Instructions
You're epileptic episode tonight appears to have been triggered by a combination
of two medications that you are currently on:  ADIPEX and WELLBUTRIN (Buproprion
).  Please discontinue both immediately.
 
Please do not climb on heights, operate machinery, drive, till seen and 
evaluated by both the psychiatrist, Dr. Avelar, as well as the neurologist, Dr. Swann.
 
Please have somebody watch her closely over the next 24 hours, for any possible,
recurrent seizures.
 
 
Discharge Counseling
   Counseled pt/family regarding diagnosis, test results, medications/RX, home 
care, follow up needs
   Comment
You're epileptic episode tonight appears to have been triggered by a combination
of two medications that you are currently on:  ADIPEX and WELLBUTRIN (Buproprion
).  Please discontinue both immediately.
 
Please do not climb on heights, operate machinery, drive, till seen and 
evaluated by both the psychiatrist, Dr. Avelar, as well as the neurologist, Dr. Swann.
 
Please have somebody watch her closely over the next 24 hours, for any possible,
recurrent seizures.
ED Critical Care
   Critical Care No
 
Electronically Signed by Amy CRUZ,Kevin HOPKINS on 10/25/17 at 0025

## 2017-10-24 NOTE — EXTERNAL MEDICAL SUMMARY RPT
[facility] Patient Summary
 Created on: 10/24/2017
 
JAMESON MOORE
External Reference #: [id number]
: 75
Sex: Female
 
Demographics
 
 
 
 Address  [address] [place] [address] [place]  [address]
 
 Home Phone  [phone]
 
 Preferred Language  Unknown
 
 Marital Status  Unknown
 
 Anabaptist Affiliation  Unknown
 
 Race  Unknown
 
 Ethnic Group  Unknown
 
 
Author
 
 
 
 Author  SULA[employer] Lauren, SULAIMAN Lauren 
 
 Organization  [employer]
 
 Address  Unknown
 
 Phone  Unavailable

## 2017-10-25 NOTE — RADIOLOGY REPORT PS360
CHEST-PORTABLE****************
 
HISTORY: Confusion, altered mental status, altered level of consciousness
AMS
ORDERING PHYSICIAN: Kevin Reyes MD
PATIENT AGE:  42 years
 
 
COMPARISON: None available
 
FINDINGS:
The cardiomediastinal silhouette and pulmonary vascularity are within normal
limits. There are increased markings in the lung bases probably related to
vascular crowding. Cannot exclude atelectasis or infiltrate in the left lung
base. Bone plate is present over the lower cervical spine. No acute bony
anomalies.
 
IMPRESSION:
1. Probable vascular crowding in the lung bases.
2. Cannot exclude patchy infiltrate/atelectasis in the left lung base. Consider
upright PA and lateral chest for further evaluation

## 2017-10-25 NOTE — RADIOLOGY REPORT PS360
CT HEAD W/O CONTRAST
 
HISTORY: Altered mental status, memory loss, confusion or disorientation
AMS
ORDERING PHYSICIAN: Kevin Reyes MD
PATIENT AGE:  42 years
 
 
COMPARISON: None
 
TECHNIQUE: Axial images obtained without contrast. Brain and bone windows
reviewed.
 
FINDINGS: No midline shift, mass effect, intracranial hemorrhage, hydrocephalus,
or extra-axial fluid collection is evident.    
 
The calvarium has an unremarkable appearance.
 
No mastoid effusion. The visualized paranasal sinuses are unremarkable.
 
IMPRESSION: Negative CT head without contrast. No acute finding.

## 2017-12-26 ENCOUNTER — HOSPITAL ENCOUNTER (EMERGENCY)
Age: 42
Discharge: HOME | End: 2017-12-26
Payer: COMMERCIAL

## 2017-12-26 DIAGNOSIS — I10: ICD-10-CM

## 2017-12-26 DIAGNOSIS — E86.0: Primary | ICD-10-CM

## 2017-12-26 DIAGNOSIS — R56.9: ICD-10-CM

## 2017-12-26 DIAGNOSIS — R53.1: ICD-10-CM

## 2017-12-26 PROCEDURE — 80053 COMPREHEN METABOLIC PANEL: CPT

## 2017-12-26 PROCEDURE — 81025 URINE PREGNANCY TEST: CPT

## 2017-12-26 PROCEDURE — 87086 URINE CULTURE/COLONY COUNT: CPT

## 2017-12-26 PROCEDURE — 81001 URINALYSIS AUTO W/SCOPE: CPT

## 2017-12-26 PROCEDURE — 85025 COMPLETE CBC W/AUTO DIFF WBC: CPT

## 2017-12-26 PROCEDURE — 99284 EMERGENCY DEPT VISIT MOD MDM: CPT

## 2017-12-26 PROCEDURE — 96365 THER/PROPH/DIAG IV INF INIT: CPT

## 2018-01-04 ENCOUNTER — HOSPITAL ENCOUNTER (OUTPATIENT)
Dept: HOSPITAL 22 - LAB | Age: 43
Discharge: HOME | End: 2018-01-04
Payer: COMMERCIAL

## 2018-01-04 VITALS
TEMPERATURE: 97.7 F | OXYGEN SATURATION: 98 % | SYSTOLIC BLOOD PRESSURE: 134 MMHG | DIASTOLIC BLOOD PRESSURE: 79 MMHG | HEART RATE: 80 BPM | RESPIRATION RATE: 18 BRPM

## 2018-01-04 VITALS — BODY MASS INDEX: 29.2 KG/M2

## 2018-01-04 VITALS
RESPIRATION RATE: 18 BRPM | OXYGEN SATURATION: 98 % | HEART RATE: 79 BPM | SYSTOLIC BLOOD PRESSURE: 132 MMHG | DIASTOLIC BLOOD PRESSURE: 80 MMHG

## 2018-01-04 VITALS
HEART RATE: 80 BPM | SYSTOLIC BLOOD PRESSURE: 136 MMHG | RESPIRATION RATE: 18 BRPM | OXYGEN SATURATION: 99 % | DIASTOLIC BLOOD PRESSURE: 82 MMHG | TEMPERATURE: 97.52 F

## 2018-01-04 DIAGNOSIS — R10.9: ICD-10-CM

## 2018-01-04 DIAGNOSIS — R19.7: Primary | ICD-10-CM

## 2018-01-04 DIAGNOSIS — E86.0: ICD-10-CM

## 2018-01-04 LAB
ALBUMIN LEVEL: 4.4 GM/DL (ref 3.4–5)
ALBUMIN/GLOB SERPL: 1.4 {RATIO} (ref 1.1–1.8)
ALP ISO SERPL-ACNC: 89 U/L (ref 46–116)
ALT SERPLBLD-CCNC: 21 U/L (ref 12–78)
ANION GAP SERPL CALC-SCNC: 14.4 MEQ/L (ref 5–15)
AST SERPL QL: 9 U/L (ref 15–37)
BILIRUBIN,TOTAL: 0.7 MG/DL (ref 0.2–1)
BUN SERPL-MCNC: 7 MG/DL (ref 7–18)
CALCIUM SPEC-MCNC: 9.4 MG/DL (ref 8.5–10.1)
CHLORIDE SPEC-SCNC: 104 MMOL/L (ref 98–107)
CO2 SERPL-SCNC: 22 MMOL/L (ref 21–32)
CREAT BLD-SCNC: 0.83 MG/DL (ref 0.55–1.02)
CREATININE CLEARANCE ESTIMATED: 114 MG/ML (ref 0–300)
ESTIMATED GLOMERULAR FILT RATE: > 60 ML/MIN (ref 60–?)
GFR (AFRICAN AMERICAN): > 60 ML/MIN (ref 60–?)
GLOBULIN SER CALC-MCNC: 3.1 GM/DL (ref 1.3–3.2)
GLUCOSE: 100 MG/DL (ref 74–106)
HCT VFR BLD CALC: 46.5 % (ref 37–47)
HGB BLD-MCNC: 15.5 G/DL (ref 12.2–16.2)
MCHC RBC-ENTMCNC: 33.4 G/DL (ref 31.8–35.4)
MCV RBC: 88 FL (ref 81–99)
MEAN CORPUSCULAR HEMOGLOBIN: 29.4 PG (ref 27–31.2)
PLATELET # BLD: 249 K/MM3 (ref 142–424)
POTASSIUM: 3.4 MMOL/L (ref 3.5–5.1)
PROT SERPL-MCNC: 7.5 GM/DL (ref 6.4–8.2)
RBC # BLD AUTO: 5.28 M/MM3 (ref 4.2–5.4)
SODIUM SPEC-SCNC: 137 MMOL/L (ref 136–145)
WBC # BLD AUTO: 6.1 K/MM3 (ref 4.8–10.8)

## 2018-01-04 PROCEDURE — 74176 CT ABD & PELVIS W/O CONTRAST: CPT

## 2018-01-04 PROCEDURE — 86256 FLUORESCENT ANTIBODY TITER: CPT

## 2018-01-04 PROCEDURE — 87507 IADNA-DNA/RNA PROBE TQ 12-25: CPT

## 2018-01-04 PROCEDURE — 85025 COMPLETE CBC W/AUTO DIFF WBC: CPT

## 2018-01-04 PROCEDURE — 87338 HPYLORI STOOL AG IA: CPT

## 2018-01-04 PROCEDURE — 96365 THER/PROPH/DIAG IV INF INIT: CPT

## 2018-01-04 PROCEDURE — 80053 COMPREHEN METABOLIC PANEL: CPT

## 2018-01-05 LAB
DEAMIDATED GLIADIN ABS, IGA: 5 UNITS (ref 0–19)
DEAMIDATED GLIADIN ABS, IGG: 3 UNITS (ref 0–19)

## 2018-01-10 ENCOUNTER — HOSPITAL ENCOUNTER (OUTPATIENT)
Dept: HOSPITAL 22 - INF | Age: 43
Discharge: HOME | End: 2018-01-10
Payer: COMMERCIAL

## 2018-01-10 VITALS — BODY MASS INDEX: 29.9 KG/M2

## 2018-01-10 VITALS — RESPIRATION RATE: 18 BRPM | HEART RATE: 85 BPM | DIASTOLIC BLOOD PRESSURE: 86 MMHG | SYSTOLIC BLOOD PRESSURE: 133 MMHG

## 2018-01-10 VITALS
RESPIRATION RATE: 20 BRPM | TEMPERATURE: 97.52 F | SYSTOLIC BLOOD PRESSURE: 138 MMHG | OXYGEN SATURATION: 97 % | HEART RATE: 86 BPM | DIASTOLIC BLOOD PRESSURE: 75 MMHG

## 2018-01-10 VITALS
TEMPERATURE: 97.34 F | OXYGEN SATURATION: 98 % | SYSTOLIC BLOOD PRESSURE: 132 MMHG | HEART RATE: 82 BPM | RESPIRATION RATE: 18 BRPM | DIASTOLIC BLOOD PRESSURE: 80 MMHG

## 2018-01-10 DIAGNOSIS — E86.0: Primary | ICD-10-CM

## 2018-01-10 LAB
ALBUMIN LEVEL: 4.9 GM/DL (ref 3.4–5)
ALBUMIN/GLOB SERPL: 1.4 {RATIO} (ref 1.1–1.8)
ALP ISO SERPL-ACNC: 105 U/L (ref 46–116)
ALT SERPLBLD-CCNC: 26 U/L (ref 12–78)
AMYLASE SERPL-CCNC: 38 U/L (ref 25–125)
ANION GAP SERPL CALC-SCNC: 16.6 MEQ/L (ref 5–15)
AST SERPL QL: 11 U/L (ref 15–37)
BILIRUBIN,TOTAL: 0.7 MG/DL (ref 0.2–1)
BUN SERPL-MCNC: 9 MG/DL (ref 7–18)
CALCIUM SPEC-MCNC: 10.1 MG/DL (ref 8.5–10.1)
CHLORIDE SPEC-SCNC: 102 MMOL/L (ref 98–107)
CO2 SERPL-SCNC: 27 MMOL/L (ref 21–32)
CREAT BLD-SCNC: 0.92 MG/DL (ref 0.55–1.02)
CREATININE CLEARANCE ESTIMATED: 106 ML/MIN (ref 0–300)
ESTIMATED GLOMERULAR FILT RATE: 67 ML/MIN (ref 60–?)
GFR (AFRICAN AMERICAN): 81 ML/MIN (ref 60–?)
GLOBULIN SER CALC-MCNC: 3.5 GM/DL (ref 1.3–3.2)
GLUCOSE: 96 MG/DL (ref 74–106)
LIPASE: 112 U/L (ref 73–393)
POTASSIUM: 4.6 MMOL/L (ref 3.5–5.1)
PROT SERPL-MCNC: 8.4 GM/DL (ref 6.4–8.2)
SODIUM SPEC-SCNC: 141 MMOL/L (ref 136–145)

## 2018-01-10 PROCEDURE — 82150 ASSAY OF AMYLASE: CPT

## 2018-01-10 PROCEDURE — 80053 COMPREHEN METABOLIC PANEL: CPT

## 2018-01-10 PROCEDURE — 83690 ASSAY OF LIPASE: CPT

## 2018-01-13 ENCOUNTER — HOSPITAL ENCOUNTER (EMERGENCY)
Age: 43
Discharge: HOME | End: 2018-01-13
Payer: COMMERCIAL

## 2018-01-13 VITALS
DIASTOLIC BLOOD PRESSURE: 73 MMHG | HEART RATE: 82 BPM | RESPIRATION RATE: 18 BRPM | SYSTOLIC BLOOD PRESSURE: 125 MMHG | TEMPERATURE: 98.06 F

## 2018-01-13 VITALS — BODY MASS INDEX: 29.9 KG/M2

## 2018-01-13 VITALS — RESPIRATION RATE: 18 BRPM | TEMPERATURE: 98.24 F | OXYGEN SATURATION: 98 %

## 2018-01-13 DIAGNOSIS — Z88.0: ICD-10-CM

## 2018-01-13 DIAGNOSIS — R42: ICD-10-CM

## 2018-01-13 DIAGNOSIS — R63.0: Primary | ICD-10-CM

## 2018-01-13 DIAGNOSIS — Z88.6: ICD-10-CM

## 2018-01-13 DIAGNOSIS — I10: ICD-10-CM

## 2018-01-13 LAB
ALBUMIN LEVEL: 4.4 GM/DL (ref 3.4–5)
ALBUMIN/GLOB SERPL: 1.5 {RATIO} (ref 1.1–1.8)
ALP ISO SERPL-ACNC: 94 U/L (ref 46–116)
ALT SERPLBLD-CCNC: 28 U/L (ref 12–78)
ANION GAP SERPL CALC-SCNC: 13.1 MEQ/L (ref 5–15)
AST SERPL QL: 18 U/L (ref 15–37)
BACTERIA URNS QL MICRO: (no result) /LPF
BILIRUBIN,TOTAL: 0.6 MG/DL (ref 0.2–1)
BUN SERPL-MCNC: 11 MG/DL (ref 7–18)
CALCIUM SPEC-MCNC: 9.3 MG/DL (ref 8.5–10.1)
CHLORIDE SPEC-SCNC: 102 MMOL/L (ref 98–107)
CO2 SERPL-SCNC: 25 MMOL/L (ref 21–32)
COLOR UR: YELLOW
CREAT BLD-SCNC: 0.77 MG/DL (ref 0.55–1.02)
CREATININE CLEARANCE ESTIMATED: 123 ML/MIN (ref 0–300)
ESTIMATED GLOMERULAR FILT RATE: 82 ML/MIN (ref 60–?)
GFR (AFRICAN AMERICAN): 99 ML/MIN (ref 60–?)
GLOBULIN SER CALC-MCNC: 3 GM/DL (ref 1.3–3.2)
GLUCOSE: 89 MG/DL (ref 74–106)
HCT VFR BLD CALC: 46.3 % (ref 37–47)
HGB BLD-MCNC: 15.2 G/DL (ref 12.2–16.2)
MCHC RBC-ENTMCNC: 32.9 G/DL (ref 31.8–35.4)
MCV RBC: 88 FL (ref 81–99)
MEAN CORPUSCULAR HEMOGLOBIN: 29 PG (ref 27–31.2)
MICRO URNS: (no result)
PH UR: 6.5 [PH] (ref 5–8.5)
PLATELET # BLD: 253 K/MM3 (ref 142–424)
POTASSIUM: 4.1 MMOL/L (ref 3.5–5.1)
PROT SERPL-MCNC: 7.4 GM/DL (ref 6.4–8.2)
RBC # BLD AUTO: 5.26 M/MM3 (ref 4.2–5.4)
SODIUM SPEC-SCNC: 136 MMOL/L (ref 136–145)
SP GR UR: 1.01 (ref 1–1.03)
UROBILINOGEN UR QL: 0.2 EU/DL
WBC # BLD AUTO: 8.7 K/MM3 (ref 4.8–10.8)

## 2018-01-13 PROCEDURE — 81001 URINALYSIS AUTO W/SCOPE: CPT

## 2018-01-13 PROCEDURE — 85025 COMPLETE CBC W/AUTO DIFF WBC: CPT

## 2018-01-13 PROCEDURE — 80053 COMPREHEN METABOLIC PANEL: CPT

## 2018-01-13 PROCEDURE — 96365 THER/PROPH/DIAG IV INF INIT: CPT

## 2018-01-13 PROCEDURE — 36415 COLL VENOUS BLD VENIPUNCTURE: CPT

## 2018-01-13 PROCEDURE — 99282 EMERGENCY DEPT VISIT SF MDM: CPT

## 2018-01-15 ENCOUNTER — HOSPITAL ENCOUNTER (OUTPATIENT)
Age: 43
End: 2018-01-15
Payer: COMMERCIAL

## 2018-01-15 DIAGNOSIS — Z00.00: Primary | ICD-10-CM

## 2018-02-02 ENCOUNTER — HOSPITAL ENCOUNTER (OUTPATIENT)
Age: 43
End: 2018-02-02
Payer: COMMERCIAL

## 2018-02-02 DIAGNOSIS — R00.0: Primary | ICD-10-CM

## 2018-02-02 PROCEDURE — 93225 XTRNL ECG REC<48 HRS REC: CPT

## 2018-02-02 PROCEDURE — 93226 XTRNL ECG REC<48 HR SCAN A/R: CPT

## 2018-02-19 ENCOUNTER — HOSPITAL ENCOUNTER (OUTPATIENT)
Dept: HOSPITAL 22 - OUTP | Age: 43
Discharge: HOME | End: 2018-02-19
Payer: COMMERCIAL

## 2018-02-19 VITALS
OXYGEN SATURATION: 98 % | DIASTOLIC BLOOD PRESSURE: 68 MMHG | RESPIRATION RATE: 18 BRPM | HEART RATE: 89 BPM | SYSTOLIC BLOOD PRESSURE: 109 MMHG

## 2018-02-19 VITALS
RESPIRATION RATE: 18 BRPM | OXYGEN SATURATION: 98 % | SYSTOLIC BLOOD PRESSURE: 112 MMHG | DIASTOLIC BLOOD PRESSURE: 67 MMHG | HEART RATE: 88 BPM

## 2018-02-19 VITALS
SYSTOLIC BLOOD PRESSURE: 113 MMHG | OXYGEN SATURATION: 92 % | TEMPERATURE: 98.24 F | HEART RATE: 87 BPM | DIASTOLIC BLOOD PRESSURE: 74 MMHG | RESPIRATION RATE: 18 BRPM

## 2018-02-19 VITALS
HEART RATE: 86 BPM | RESPIRATION RATE: 18 BRPM | DIASTOLIC BLOOD PRESSURE: 72 MMHG | OXYGEN SATURATION: 98 % | SYSTOLIC BLOOD PRESSURE: 114 MMHG

## 2018-02-19 VITALS
SYSTOLIC BLOOD PRESSURE: 121 MMHG | HEART RATE: 84 BPM | TEMPERATURE: 97 F | DIASTOLIC BLOOD PRESSURE: 77 MMHG | OXYGEN SATURATION: 100 % | RESPIRATION RATE: 22 BRPM

## 2018-02-19 VITALS
RESPIRATION RATE: 18 BRPM | DIASTOLIC BLOOD PRESSURE: 70 MMHG | HEART RATE: 91 BPM | SYSTOLIC BLOOD PRESSURE: 115 MMHG | OXYGEN SATURATION: 97 %

## 2018-02-19 VITALS
RESPIRATION RATE: 18 BRPM | DIASTOLIC BLOOD PRESSURE: 76 MMHG | HEART RATE: 81 BPM | OXYGEN SATURATION: 99 % | SYSTOLIC BLOOD PRESSURE: 122 MMHG

## 2018-02-19 VITALS — BODY MASS INDEX: 30.2 KG/M2

## 2018-02-19 VITALS — OXYGEN SATURATION: 97 %

## 2018-02-19 DIAGNOSIS — K22.4: ICD-10-CM

## 2018-02-19 DIAGNOSIS — K29.60: ICD-10-CM

## 2018-02-19 DIAGNOSIS — K21.9: Primary | ICD-10-CM

## 2018-02-19 PROCEDURE — 0DJ08ZZ INSPECTION OF UPPER INTESTINAL TRACT, VIA NATURAL OR ARTIFICIAL OPENING ENDOSCOPIC: ICD-10-PCS | Performed by: INTERNAL MEDICINE

## 2018-02-19 PROCEDURE — 99152 MOD SED SAME PHYS/QHP 5/>YRS: CPT

## 2018-02-19 PROCEDURE — 43239 EGD BIOPSY SINGLE/MULTIPLE: CPT

## 2018-02-19 PROCEDURE — 81025 URINE PREGNANCY TEST: CPT

## 2019-09-12 ENCOUNTER — HOSPITAL ENCOUNTER (OUTPATIENT)
Age: 44
End: 2019-09-12
Payer: COMMERCIAL

## 2019-09-12 DIAGNOSIS — N95.1: Primary | ICD-10-CM

## 2019-09-12 DIAGNOSIS — R53.83: ICD-10-CM

## 2019-09-12 LAB
FT4I SERPL CALC-MCNC: 3.4 UG/DL (ref 5.93–13.13)
T3RU NFR SERPL: 33 % (ref 31–39)
T4 (THYROXINE): 10.3 UG/DL (ref 4.7–13.3)
TSH SERPL-ACNC: 2.04 UIU/ML (ref 0.36–3.74)

## 2019-09-12 PROCEDURE — 84479 ASSAY OF THYROID (T3 OR T4): CPT

## 2019-09-12 PROCEDURE — 84436 ASSAY OF TOTAL THYROXINE: CPT

## 2019-09-12 PROCEDURE — 36415 COLL VENOUS BLD VENIPUNCTURE: CPT

## 2019-09-12 PROCEDURE — 83001 ASSAY OF GONADOTROPIN (FSH): CPT

## 2019-09-12 PROCEDURE — 84443 ASSAY THYROID STIM HORMONE: CPT

## 2019-09-12 PROCEDURE — 82670 ASSAY OF TOTAL ESTRADIOL: CPT

## 2019-09-12 PROCEDURE — 83002 ASSAY OF GONADOTROPIN (LH): CPT

## 2019-09-13 LAB
FSH: 5 MIU/ML
LH: 3 MIU/ML

## 2020-07-31 ENCOUNTER — HOSPITAL ENCOUNTER (OUTPATIENT)
Age: 45
End: 2020-07-31
Payer: COMMERCIAL

## 2020-07-31 DIAGNOSIS — Z03.818: Primary | ICD-10-CM

## 2020-07-31 LAB
HCT VFR BLD CALC: 44.9 % (ref 37–47)
HGB BLD-MCNC: 14.7 G/DL (ref 12.2–16.2)
MCHC RBC-ENTMCNC: 32.7 G/DL (ref 31.8–35.4)
MCV RBC: 90.5 FL (ref 81–99)
MEAN CORPUSCULAR HEMOGLOBIN: 29.6 PG (ref 27–31.2)
PLATELET # BLD: 252 K/MM3 (ref 142–424)
RBC # BLD AUTO: 4.96 M/MM3 (ref 4.2–5.4)
WBC # BLD AUTO: 8.2 K/MM3 (ref 4.8–10.8)

## 2020-07-31 PROCEDURE — 71045 X-RAY EXAM CHEST 1 VIEW: CPT

## 2020-07-31 PROCEDURE — 85025 COMPLETE CBC W/AUTO DIFF WBC: CPT

## 2020-07-31 PROCEDURE — U0004 COV-19 TEST NON-CDC HGH THRU: HCPCS

## 2020-07-31 PROCEDURE — 36415 COLL VENOUS BLD VENIPUNCTURE: CPT

## 2020-08-01 LAB — COVID-19 NASAL PCR SENDOUT LEX: NOT DETECTED

## 2020-08-06 ENCOUNTER — HOSPITAL ENCOUNTER (OUTPATIENT)
Age: 45
End: 2020-08-06
Payer: COMMERCIAL

## 2020-08-06 DIAGNOSIS — Z03.818: Primary | ICD-10-CM

## 2020-08-06 PROCEDURE — U0003 INFECTIOUS AGENT DETECTION BY NUCLEIC ACID (DNA OR RNA); SEVERE ACUTE RESPIRATORY SYNDROME CORONAVIRUS 2 (SARS-COV-2) (CORONAVIRUS DISEASE [COVID-19]), AMPLIFIED PROBE TECHNIQUE, MAKING USE OF HIGH THROUGHPUT TECHNOLOGIES AS DESCRIBED BY CMS-2020-01-R: HCPCS

## 2020-08-10 ENCOUNTER — HOSPITAL ENCOUNTER (OUTPATIENT)
Age: 45
End: 2020-08-10
Payer: COMMERCIAL

## 2020-08-10 VITALS — HEART RATE: 56 BPM

## 2020-08-10 DIAGNOSIS — R05: Primary | ICD-10-CM

## 2020-08-10 PROCEDURE — 94060 EVALUATION OF WHEEZING: CPT

## 2020-08-10 PROCEDURE — 94640 AIRWAY INHALATION TREATMENT: CPT

## 2020-10-20 ENCOUNTER — HOSPITAL ENCOUNTER (OUTPATIENT)
Age: 45
End: 2020-10-20
Payer: COMMERCIAL

## 2020-10-20 DIAGNOSIS — Z03.818: Primary | ICD-10-CM

## 2020-10-20 LAB
HCT VFR BLD CALC: 46.8 % (ref 37–47)
HGB BLD-MCNC: 15.1 G/DL (ref 12.2–16.2)
MCHC RBC-ENTMCNC: 32.3 G/DL (ref 31.8–35.4)
MCV RBC: 90.7 FL (ref 81–99)
MEAN CORPUSCULAR HEMOGLOBIN: 29.3 PG (ref 27–31.2)
PLATELET # BLD: 262 K/MM3 (ref 142–424)
RBC # BLD AUTO: 5.16 M/MM3 (ref 4.2–5.4)
WBC # BLD AUTO: 8.4 K/MM3 (ref 4.8–10.8)

## 2020-10-20 PROCEDURE — 36415 COLL VENOUS BLD VENIPUNCTURE: CPT

## 2020-10-20 PROCEDURE — 85025 COMPLETE CBC W/AUTO DIFF WBC: CPT

## 2020-10-20 PROCEDURE — U0003 INFECTIOUS AGENT DETECTION BY NUCLEIC ACID (DNA OR RNA); SEVERE ACUTE RESPIRATORY SYNDROME CORONAVIRUS 2 (SARS-COV-2) (CORONAVIRUS DISEASE [COVID-19]), AMPLIFIED PROBE TECHNIQUE, MAKING USE OF HIGH THROUGHPUT TECHNOLOGIES AS DESCRIBED BY CMS-2020-01-R: HCPCS

## 2020-11-20 ENCOUNTER — HOSPITAL ENCOUNTER (OUTPATIENT)
Age: 45
End: 2020-11-20
Payer: COMMERCIAL

## 2020-11-20 DIAGNOSIS — N93.8: Primary | ICD-10-CM

## 2020-11-20 PROCEDURE — 76830 TRANSVAGINAL US NON-OB: CPT

## 2020-11-28 ENCOUNTER — HOSPITAL ENCOUNTER (OUTPATIENT)
Age: 45
End: 2020-11-28
Payer: COMMERCIAL

## 2020-11-28 DIAGNOSIS — Z01.818: Primary | ICD-10-CM

## 2020-11-28 DIAGNOSIS — N93.9: ICD-10-CM

## 2020-11-28 DIAGNOSIS — N92.0: ICD-10-CM

## 2020-11-28 LAB
ANION GAP SERPL CALC-SCNC: 14.8 MEQ/L (ref 5–15)
BUN SERPL-MCNC: 12 MG/DL (ref 7–17)
CALCIUM SPEC-MCNC: 10 MG/DL (ref 8.4–10.2)
CHLORIDE SPEC-SCNC: 107 MMOL/L (ref 98–107)
CO2 SERPL-SCNC: 22 MMOL/L (ref 22–30)
CREAT BLD-SCNC: 0.7 MG/DL (ref 0.52–1.04)
ESTIMATED GLOMERULAR FILT RATE: 90 ML/MIN (ref 60–?)
GFR (AFRICAN AMERICAN): 109 ML/MIN (ref 60–?)
GLUCOSE: 91 MG/DL (ref 74–100)
HCT VFR BLD CALC: 46.4 % (ref 37–47)
HGB BLD-MCNC: 14.9 G/DL (ref 12.2–16.2)
MCHC RBC-ENTMCNC: 32 G/DL (ref 31.8–35.4)
MCV RBC: 91.3 FL (ref 81–99)
MEAN CORPUSCULAR HEMOGLOBIN: 29.2 PG (ref 27–31.2)
PLATELET # BLD: 287 K/MM3 (ref 142–424)
POTASSIUM: 3.8 MMOL/L (ref 3.5–5.1)
RBC # BLD AUTO: 5.08 M/MM3 (ref 4.2–5.4)
SODIUM SPEC-SCNC: 140 MMOL/L (ref 136–145)
WBC # BLD AUTO: 8.4 K/MM3 (ref 4.8–10.8)

## 2020-11-28 PROCEDURE — 85025 COMPLETE CBC W/AUTO DIFF WBC: CPT

## 2020-11-28 PROCEDURE — 84703 CHORIONIC GONADOTROPIN ASSAY: CPT

## 2020-11-28 PROCEDURE — 80048 BASIC METABOLIC PNL TOTAL CA: CPT

## 2020-11-28 PROCEDURE — 36415 COLL VENOUS BLD VENIPUNCTURE: CPT

## 2020-11-28 PROCEDURE — 86328 IA NFCT AB SARSCOV2 COVID19: CPT

## 2020-11-30 ENCOUNTER — HOSPITAL ENCOUNTER (OUTPATIENT)
Dept: HOSPITAL 22 - OR | Age: 45
Discharge: HOME | End: 2020-11-30
Payer: COMMERCIAL

## 2020-11-30 VITALS
TEMPERATURE: 97.7 F | DIASTOLIC BLOOD PRESSURE: 74 MMHG | RESPIRATION RATE: 16 BRPM | SYSTOLIC BLOOD PRESSURE: 120 MMHG | HEART RATE: 89 BPM | OXYGEN SATURATION: 95 %

## 2020-11-30 VITALS
SYSTOLIC BLOOD PRESSURE: 130 MMHG | RESPIRATION RATE: 18 BRPM | TEMPERATURE: 97.16 F | DIASTOLIC BLOOD PRESSURE: 89 MMHG | HEART RATE: 95 BPM | OXYGEN SATURATION: 98 %

## 2020-11-30 VITALS
TEMPERATURE: 97.7 F | SYSTOLIC BLOOD PRESSURE: 120 MMHG | RESPIRATION RATE: 16 BRPM | DIASTOLIC BLOOD PRESSURE: 74 MMHG | HEART RATE: 89 BPM | OXYGEN SATURATION: 95 %

## 2020-11-30 VITALS — HEART RATE: 84 BPM | DIASTOLIC BLOOD PRESSURE: 71 MMHG | TEMPERATURE: 97.9 F | SYSTOLIC BLOOD PRESSURE: 110 MMHG

## 2020-11-30 VITALS
DIASTOLIC BLOOD PRESSURE: 71 MMHG | RESPIRATION RATE: 18 BRPM | OXYGEN SATURATION: 97 % | HEART RATE: 72 BPM | SYSTOLIC BLOOD PRESSURE: 110 MMHG | TEMPERATURE: 97.9 F

## 2020-11-30 VITALS
HEART RATE: 75 BPM | OXYGEN SATURATION: 97 % | DIASTOLIC BLOOD PRESSURE: 64 MMHG | SYSTOLIC BLOOD PRESSURE: 107 MMHG | RESPIRATION RATE: 18 BRPM

## 2020-11-30 VITALS
OXYGEN SATURATION: 98 % | TEMPERATURE: 97.6 F | SYSTOLIC BLOOD PRESSURE: 116 MMHG | DIASTOLIC BLOOD PRESSURE: 84 MMHG | RESPIRATION RATE: 18 BRPM | HEART RATE: 91 BPM

## 2020-11-30 VITALS
RESPIRATION RATE: 18 BRPM | DIASTOLIC BLOOD PRESSURE: 76 MMHG | HEART RATE: 91 BPM | SYSTOLIC BLOOD PRESSURE: 111 MMHG | OXYGEN SATURATION: 99 %

## 2020-11-30 VITALS
RESPIRATION RATE: 18 BRPM | OXYGEN SATURATION: 97 % | SYSTOLIC BLOOD PRESSURE: 109 MMHG | HEART RATE: 83 BPM | DIASTOLIC BLOOD PRESSURE: 73 MMHG

## 2020-11-30 VITALS — RESPIRATION RATE: 18 BRPM | HEART RATE: 87 BPM | SYSTOLIC BLOOD PRESSURE: 122 MMHG | DIASTOLIC BLOOD PRESSURE: 88 MMHG

## 2020-11-30 VITALS — BODY MASS INDEX: 29.2 KG/M2

## 2020-11-30 DIAGNOSIS — E78.5: ICD-10-CM

## 2020-11-30 DIAGNOSIS — N92.0: Primary | ICD-10-CM

## 2020-11-30 DIAGNOSIS — J45.909: ICD-10-CM

## 2020-11-30 DIAGNOSIS — N99.81: ICD-10-CM

## 2020-11-30 DIAGNOSIS — I10: ICD-10-CM

## 2020-11-30 PROCEDURE — 0U5B8ZZ DESTRUCTION OF ENDOMETRIUM, VIA NATURAL OR ARTIFICIAL OPENING ENDOSCOPIC: ICD-10-PCS | Performed by: OBSTETRICS & GYNECOLOGY

## 2020-11-30 PROCEDURE — 96374 THER/PROPH/DIAG INJ IV PUSH: CPT

## 2020-11-30 PROCEDURE — 58563 HYSTEROSCOPY ABLATION: CPT

## 2020-12-03 ENCOUNTER — HOSPITAL ENCOUNTER (OUTPATIENT)
Age: 45
End: 2020-12-03
Payer: COMMERCIAL

## 2020-12-03 DIAGNOSIS — Z01.818: Primary | ICD-10-CM

## 2020-12-03 DIAGNOSIS — Z13.810: ICD-10-CM

## 2020-12-04 ENCOUNTER — HOSPITAL ENCOUNTER (OUTPATIENT)
Dept: HOSPITAL 22 - OUTP | Age: 45
Discharge: HOME | End: 2020-12-04
Payer: COMMERCIAL

## 2020-12-04 ENCOUNTER — ON CAMPUS - OUTPATIENT (OUTPATIENT)
Dept: RURAL HOSPITAL 6 | Facility: HOSPITAL | Age: 45
End: 2020-12-04

## 2020-12-04 VITALS — OXYGEN SATURATION: 97 %

## 2020-12-04 VITALS
RESPIRATION RATE: 18 BRPM | OXYGEN SATURATION: 98 % | DIASTOLIC BLOOD PRESSURE: 84 MMHG | SYSTOLIC BLOOD PRESSURE: 134 MMHG | HEART RATE: 76 BPM | TEMPERATURE: 98.24 F

## 2020-12-04 VITALS
RESPIRATION RATE: 18 BRPM | DIASTOLIC BLOOD PRESSURE: 88 MMHG | OXYGEN SATURATION: 987 % | SYSTOLIC BLOOD PRESSURE: 137 MMHG | HEART RATE: 79 BPM

## 2020-12-04 VITALS
HEART RATE: 76 BPM | RESPIRATION RATE: 18 BRPM | SYSTOLIC BLOOD PRESSURE: 121 MMHG | OXYGEN SATURATION: 96 % | DIASTOLIC BLOOD PRESSURE: 75 MMHG

## 2020-12-04 VITALS
RESPIRATION RATE: 18 BRPM | DIASTOLIC BLOOD PRESSURE: 87 MMHG | HEART RATE: 74 BPM | OXYGEN SATURATION: 96 % | SYSTOLIC BLOOD PRESSURE: 137 MMHG

## 2020-12-04 VITALS
DIASTOLIC BLOOD PRESSURE: 70 MMHG | RESPIRATION RATE: 18 BRPM | TEMPERATURE: 97.16 F | HEART RATE: 85 BPM | SYSTOLIC BLOOD PRESSURE: 119 MMHG | OXYGEN SATURATION: 91 %

## 2020-12-04 VITALS — BODY MASS INDEX: 28.7 KG/M2

## 2020-12-04 DIAGNOSIS — R11.0 NAUSEA: ICD-10-CM

## 2020-12-04 DIAGNOSIS — E78.5: ICD-10-CM

## 2020-12-04 DIAGNOSIS — K21.9: Primary | ICD-10-CM

## 2020-12-04 DIAGNOSIS — K31.9: ICD-10-CM

## 2020-12-04 DIAGNOSIS — I10: ICD-10-CM

## 2020-12-04 DIAGNOSIS — J45.909: ICD-10-CM

## 2020-12-04 DIAGNOSIS — Z88.6: ICD-10-CM

## 2020-12-04 DIAGNOSIS — Z88.0: ICD-10-CM

## 2020-12-04 DIAGNOSIS — K30 FUNCTIONAL DYSPEPSIA: ICD-10-CM

## 2020-12-04 DIAGNOSIS — K44.9: ICD-10-CM

## 2020-12-04 DIAGNOSIS — R10.13 EPIGASTRIC PAIN: ICD-10-CM

## 2020-12-04 DIAGNOSIS — K29.50 UNSPECIFIED CHRONIC GASTRITIS WITHOUT BLEEDING: ICD-10-CM

## 2020-12-04 DIAGNOSIS — K21.9 GASTRO-ESOPHAGEAL REFLUX DISEASE WITHOUT ESOPHAGITIS: ICD-10-CM

## 2020-12-04 DIAGNOSIS — K22.4: ICD-10-CM

## 2020-12-04 DIAGNOSIS — Z79.899: ICD-10-CM

## 2020-12-04 PROCEDURE — 81025 URINE PREGNANCY TEST: CPT

## 2020-12-04 PROCEDURE — 43239 EGD BIOPSY SINGLE/MULTIPLE: CPT | Performed by: INTERNAL MEDICINE

## 2020-12-04 PROCEDURE — 84702 CHORIONIC GONADOTROPIN TEST: CPT

## 2020-12-04 PROCEDURE — 86328 IA NFCT AB SARSCOV2 COVID19: CPT

## 2020-12-04 PROCEDURE — 0DJ08ZZ INSPECTION OF UPPER INTESTINAL TRACT, VIA NATURAL OR ARTIFICIAL OPENING ENDOSCOPIC: ICD-10-PCS | Performed by: INTERNAL MEDICINE

## 2020-12-04 PROCEDURE — 36415 COLL VENOUS BLD VENIPUNCTURE: CPT

## 2020-12-04 PROCEDURE — 43239 EGD BIOPSY SINGLE/MULTIPLE: CPT

## 2020-12-29 ENCOUNTER — HOSPITAL ENCOUNTER (EMERGENCY)
Age: 45
Discharge: HOME | End: 2020-12-29
Payer: COMMERCIAL

## 2020-12-29 VITALS
HEART RATE: 69 BPM | OXYGEN SATURATION: 96 % | RESPIRATION RATE: 19 BRPM | TEMPERATURE: 98.42 F | DIASTOLIC BLOOD PRESSURE: 77 MMHG | SYSTOLIC BLOOD PRESSURE: 134 MMHG

## 2020-12-29 VITALS
DIASTOLIC BLOOD PRESSURE: 77 MMHG | TEMPERATURE: 98.4 F | HEART RATE: 69 BPM | OXYGEN SATURATION: 96 % | SYSTOLIC BLOOD PRESSURE: 134 MMHG | RESPIRATION RATE: 19 BRPM

## 2020-12-29 VITALS — BODY MASS INDEX: 28 KG/M2

## 2020-12-29 DIAGNOSIS — I10: ICD-10-CM

## 2020-12-29 DIAGNOSIS — U07.1: Primary | ICD-10-CM

## 2020-12-29 DIAGNOSIS — E78.5: ICD-10-CM

## 2020-12-29 DIAGNOSIS — Z79.899: ICD-10-CM

## 2020-12-29 DIAGNOSIS — I48.91: ICD-10-CM

## 2020-12-29 PROCEDURE — 99201: CPT

## 2020-12-29 PROCEDURE — U0004 COV-19 TEST NON-CDC HGH THRU: HCPCS

## 2020-12-30 LAB — COVID-19 NASAL PCR SENDOUT P&C: POSITIVE

## 2021-03-17 ENCOUNTER — HOSPITAL ENCOUNTER (OUTPATIENT)
Age: 46
End: 2021-03-17
Payer: COMMERCIAL

## 2021-03-17 DIAGNOSIS — Z20.822: Primary | ICD-10-CM

## 2021-03-17 DIAGNOSIS — Z86.16: ICD-10-CM

## 2021-03-17 LAB
HCT VFR BLD CALC: 43 % (ref 37–47)
HGB BLD-MCNC: 14.1 G/DL (ref 12.2–16.2)
MCHC RBC-ENTMCNC: 32.8 G/DL (ref 31.8–35.4)
MCV RBC: 91 FL (ref 81–99)
MEAN CORPUSCULAR HEMOGLOBIN: 29.8 PG (ref 27–31.2)
PLATELET # BLD: 296 K/MM3 (ref 142–424)
RBC # BLD AUTO: 4.73 M/MM3 (ref 4.2–5.4)
WBC # BLD AUTO: 10.6 K/MM3 (ref 4.8–10.8)

## 2021-03-17 PROCEDURE — U0003 INFECTIOUS AGENT DETECTION BY NUCLEIC ACID (DNA OR RNA); SEVERE ACUTE RESPIRATORY SYNDROME CORONAVIRUS 2 (SARS-COV-2) (CORONAVIRUS DISEASE [COVID-19]), AMPLIFIED PROBE TECHNIQUE, MAKING USE OF HIGH THROUGHPUT TECHNOLOGIES AS DESCRIBED BY CMS-2020-01-R: HCPCS

## 2021-03-17 PROCEDURE — 36415 COLL VENOUS BLD VENIPUNCTURE: CPT

## 2021-03-17 PROCEDURE — 86328 IA NFCT AB SARSCOV2 COVID19: CPT

## 2021-03-17 PROCEDURE — 85025 COMPLETE CBC W/AUTO DIFF WBC: CPT

## 2021-04-02 ENCOUNTER — HOSPITAL ENCOUNTER (OUTPATIENT)
Age: 46
End: 2021-04-02
Payer: COMMERCIAL

## 2021-04-02 DIAGNOSIS — R53.83: Primary | ICD-10-CM

## 2021-04-02 DIAGNOSIS — R63.5: ICD-10-CM

## 2021-04-02 DIAGNOSIS — R63.1: ICD-10-CM

## 2021-04-02 LAB
25-OH VITAMIN D, TOTAL: 63.1 NG/ML (ref 30–100)
ALBUMIN LEVEL: 4.6 G/DL (ref 3.5–5)
ALBUMIN/GLOB SERPL: 1.9 {RATIO} (ref 1.1–1.8)
ALP ISO SERPL-ACNC: 77 U/L (ref 38–126)
ALT SERPLBLD-CCNC: 21 U/L (ref 12–78)
ANION GAP SERPL CALC-SCNC: 12.1 MEQ/L (ref 5–15)
AST SERPL QL: 23 U/L (ref 14–36)
BILIRUBIN,TOTAL: 0.4 MG/DL (ref 0.2–1.3)
BUN SERPL-MCNC: 16 MG/DL (ref 7–17)
CALCIUM SPEC-MCNC: 9.6 MG/DL (ref 8.4–10.2)
CHLORIDE SPEC-SCNC: 106 MMOL/L (ref 98–107)
CO2 SERPL-SCNC: 25 MMOL/L (ref 22–30)
CREAT BLD-SCNC: 0.7 MG/DL (ref 0.52–1.04)
ESTIMATED GLOMERULAR FILT RATE: 90 ML/MIN (ref 60–?)
GFR (AFRICAN AMERICAN): 109 ML/MIN (ref 60–?)
GLOBULIN SER CALC-MCNC: 2.4 G/DL (ref 1.3–3.2)
GLUCOSE: 89 MG/DL (ref 74–100)
HCT VFR BLD CALC: 45.1 % (ref 37–47)
HGB BLD-MCNC: 14 G/DL (ref 12.2–16.2)
MAGNESIUM: 1.9 MG/DL (ref 1.6–2.3)
MCHC RBC-ENTMCNC: 31 G/DL (ref 31.8–35.4)
MCV RBC: 93.7 FL (ref 81–99)
MEAN CORPUSCULAR HEMOGLOBIN: 29 PG (ref 27–31.2)
PHOSPHOROUS: 4.2 MG/DL (ref 2.5–4.5)
PLATELET # BLD: 254 K/MM3 (ref 142–424)
POTASSIUM: 4.1 MMOL/L (ref 3.5–5.1)
PROT SERPL-MCNC: 7 G/DL (ref 6.3–8.2)
RBC # BLD AUTO: 4.81 M/MM3 (ref 4.2–5.4)
SODIUM SPEC-SCNC: 139 MMOL/L (ref 136–145)
TSH SERPL-ACNC: 1.15 UIU/ML (ref 0.47–4.68)
VITAMIN B12: 309 PG/ML (ref 239–931)
WBC # BLD AUTO: 4.7 K/MM3 (ref 4.8–10.8)

## 2021-04-02 PROCEDURE — 80053 COMPREHEN METABOLIC PANEL: CPT

## 2021-04-02 PROCEDURE — 82306 VITAMIN D 25 HYDROXY: CPT

## 2021-04-02 PROCEDURE — 84100 ASSAY OF PHOSPHORUS: CPT

## 2021-04-02 PROCEDURE — 36415 COLL VENOUS BLD VENIPUNCTURE: CPT

## 2021-04-02 PROCEDURE — 82607 VITAMIN B-12: CPT

## 2021-04-02 PROCEDURE — 84443 ASSAY THYROID STIM HORMONE: CPT

## 2021-04-02 PROCEDURE — 83735 ASSAY OF MAGNESIUM: CPT

## 2021-04-02 PROCEDURE — 85025 COMPLETE CBC W/AUTO DIFF WBC: CPT

## 2021-04-24 ENCOUNTER — HOSPITAL ENCOUNTER (EMERGENCY)
Age: 46
Discharge: HOME | End: 2021-04-24
Payer: COMMERCIAL

## 2021-04-24 VITALS
TEMPERATURE: 98.06 F | RESPIRATION RATE: 16 BRPM | HEART RATE: 87 BPM | SYSTOLIC BLOOD PRESSURE: 139 MMHG | OXYGEN SATURATION: 99 % | DIASTOLIC BLOOD PRESSURE: 88 MMHG

## 2021-04-24 VITALS
RESPIRATION RATE: 16 BRPM | OXYGEN SATURATION: 99 % | SYSTOLIC BLOOD PRESSURE: 139 MMHG | DIASTOLIC BLOOD PRESSURE: 88 MMHG | HEART RATE: 87 BPM | TEMPERATURE: 98.06 F

## 2021-04-24 VITALS — BODY MASS INDEX: 31.9 KG/M2

## 2021-04-24 DIAGNOSIS — I48.91: ICD-10-CM

## 2021-04-24 DIAGNOSIS — I10: ICD-10-CM

## 2021-04-24 DIAGNOSIS — Z20.822: Primary | ICD-10-CM

## 2021-04-24 DIAGNOSIS — E78.5: ICD-10-CM

## 2021-04-24 DIAGNOSIS — Z88.5: ICD-10-CM

## 2021-04-24 DIAGNOSIS — Z88.0: ICD-10-CM

## 2021-04-24 DIAGNOSIS — B34.9: ICD-10-CM

## 2021-04-24 LAB
PH,URINE: 5.5 (ref 5–8.5)
SPECIFIC GRAVITY, URINE: >= 1.03 (ref 1–1.03)
UROBILINOGEN,URINE: 2 EU/DL

## 2021-04-24 PROCEDURE — 87880 STREP A ASSAY W/OPTIC: CPT

## 2021-04-24 PROCEDURE — 87186 SC STD MICRODIL/AGAR DIL: CPT

## 2021-04-24 PROCEDURE — 87086 URINE CULTURE/COLONY COUNT: CPT

## 2021-04-24 PROCEDURE — G0463 HOSPITAL OUTPT CLINIC VISIT: HCPCS

## 2021-04-24 PROCEDURE — 81003 URINALYSIS AUTO W/O SCOPE: CPT

## 2021-04-24 PROCEDURE — U0003 INFECTIOUS AGENT DETECTION BY NUCLEIC ACID (DNA OR RNA); SEVERE ACUTE RESPIRATORY SYNDROME CORONAVIRUS 2 (SARS-COV-2) (CORONAVIRUS DISEASE [COVID-19]), AMPLIFIED PROBE TECHNIQUE, MAKING USE OF HIGH THROUGHPUT TECHNOLOGIES AS DESCRIBED BY CMS-2020-01-R: HCPCS

## 2021-04-24 PROCEDURE — 99202 OFFICE O/P NEW SF 15 MIN: CPT

## 2021-04-24 PROCEDURE — 87088 URINE BACTERIA CULTURE: CPT

## 2021-05-04 ENCOUNTER — HOSPITAL ENCOUNTER (OUTPATIENT)
Age: 46
End: 2021-05-04
Payer: COMMERCIAL

## 2021-05-04 DIAGNOSIS — B97.89: ICD-10-CM

## 2021-05-04 DIAGNOSIS — Z20.822: Primary | ICD-10-CM

## 2021-05-04 LAB
HCT VFR BLD CALC: 40.6 % (ref 37–47)
HGB BLD-MCNC: 13.5 G/DL (ref 12.2–16.2)
MCHC RBC-ENTMCNC: 33.3 G/DL (ref 31.8–35.4)
MCV RBC: 86.2 FL (ref 81–99)
MEAN CORPUSCULAR HEMOGLOBIN: 28.7 PG (ref 27–31.2)
PLATELET # BLD: 234 K/MM3 (ref 142–424)
RBC # BLD AUTO: 4.71 M/MM3 (ref 4.2–5.4)
WBC # BLD AUTO: 6 K/MM3 (ref 4.8–10.8)

## 2021-05-04 PROCEDURE — 87633 RESP VIRUS 12-25 TARGETS: CPT

## 2021-05-04 PROCEDURE — 85025 COMPLETE CBC W/AUTO DIFF WBC: CPT

## 2021-05-04 PROCEDURE — 87581 M.PNEUMON DNA AMP PROBE: CPT

## 2021-05-04 PROCEDURE — 36415 COLL VENOUS BLD VENIPUNCTURE: CPT

## 2021-05-04 PROCEDURE — 87798 DETECT AGENT NOS DNA AMP: CPT

## 2021-09-01 ENCOUNTER — HOSPITAL ENCOUNTER (OUTPATIENT)
Age: 46
End: 2021-09-01
Payer: COMMERCIAL

## 2021-09-01 DIAGNOSIS — R07.81: Primary | ICD-10-CM

## 2021-09-01 PROCEDURE — 71250 CT THORAX DX C-: CPT

## 2021-09-02 ENCOUNTER — HOSPITAL ENCOUNTER (OUTPATIENT)
Dept: HOSPITAL 22 - PT | Age: 46
Discharge: HOME | End: 2021-09-02
Payer: COMMERCIAL

## 2021-09-02 DIAGNOSIS — S29.011A: ICD-10-CM

## 2021-09-02 DIAGNOSIS — R07.89: Primary | ICD-10-CM

## 2021-09-02 PROCEDURE — 97110 THERAPEUTIC EXERCISES: CPT

## 2021-09-02 PROCEDURE — 97163 PT EVAL HIGH COMPLEX 45 MIN: CPT

## 2021-12-10 ENCOUNTER — HOSPITAL ENCOUNTER (OUTPATIENT)
Dept: HOSPITAL 22 - COVID.OUT | Age: 46
End: 2021-12-10
Payer: COMMERCIAL

## 2021-12-10 DIAGNOSIS — Z20.822: Primary | ICD-10-CM

## 2021-12-10 LAB
HCT VFR BLD CALC: 41.8 % (ref 37–47)
HGB BLD-MCNC: 13.7 G/DL (ref 12.2–16.2)
MCHC RBC-ENTMCNC: 32.7 G/DL (ref 31.8–35.4)
MCV RBC: 88.6 FL (ref 81–99)
MEAN CORPUSCULAR HEMOGLOBIN: 29 PG (ref 27–31.2)
PLATELET # BLD: 228 K/MM3 (ref 142–424)
RBC # BLD AUTO: 4.72 M/MM3 (ref 4.2–5.4)
WBC # BLD AUTO: 3.9 K/MM3 (ref 4.8–10.8)

## 2021-12-10 PROCEDURE — 87276 INFLUENZA A AG IF: CPT

## 2021-12-10 PROCEDURE — 36415 COLL VENOUS BLD VENIPUNCTURE: CPT

## 2021-12-10 PROCEDURE — U0003 INFECTIOUS AGENT DETECTION BY NUCLEIC ACID (DNA OR RNA); SEVERE ACUTE RESPIRATORY SYNDROME CORONAVIRUS 2 (SARS-COV-2) (CORONAVIRUS DISEASE [COVID-19]), AMPLIFIED PROBE TECHNIQUE, MAKING USE OF HIGH THROUGHPUT TECHNOLOGIES AS DESCRIBED BY CMS-2020-01-R: HCPCS

## 2021-12-10 PROCEDURE — 87275 INFLUENZA B AG IF: CPT

## 2021-12-10 PROCEDURE — U0005 INFEC AGEN DETEC AMPLI PROBE: HCPCS

## 2021-12-10 PROCEDURE — 71045 X-RAY EXAM CHEST 1 VIEW: CPT

## 2021-12-10 PROCEDURE — 85025 COMPLETE CBC W/AUTO DIFF WBC: CPT

## 2021-12-10 PROCEDURE — C9803 HOPD COVID-19 SPEC COLLECT: HCPCS

## 2021-12-16 ENCOUNTER — HOSPITAL ENCOUNTER (OUTPATIENT)
Age: 46
End: 2021-12-16
Payer: COMMERCIAL

## 2021-12-16 DIAGNOSIS — Z20.822: Primary | ICD-10-CM

## 2021-12-16 DIAGNOSIS — B97.81: ICD-10-CM

## 2021-12-16 LAB
HCT VFR BLD CALC: 40.6 % (ref 37–47)
HGB BLD-MCNC: 13.9 G/DL (ref 12.2–16.2)
MCHC RBC-ENTMCNC: 34.3 G/DL (ref 31.8–35.4)
MCV RBC: 85.8 FL (ref 81–99)
MEAN CORPUSCULAR HEMOGLOBIN: 29.4 PG (ref 27–31.2)
PLATELET # BLD: 302 K/MM3 (ref 142–424)
RBC # BLD AUTO: 4.73 M/MM3 (ref 4.2–5.4)
WBC # BLD AUTO: 9.9 K/MM3 (ref 4.8–10.8)

## 2021-12-16 PROCEDURE — 87632 RESP VIRUS 6-11 TARGETS: CPT

## 2021-12-16 PROCEDURE — U0003 INFECTIOUS AGENT DETECTION BY NUCLEIC ACID (DNA OR RNA); SEVERE ACUTE RESPIRATORY SYNDROME CORONAVIRUS 2 (SARS-COV-2) (CORONAVIRUS DISEASE [COVID-19]), AMPLIFIED PROBE TECHNIQUE, MAKING USE OF HIGH THROUGHPUT TECHNOLOGIES AS DESCRIBED BY CMS-2020-01-R: HCPCS

## 2021-12-16 PROCEDURE — C9803 HOPD COVID-19 SPEC COLLECT: HCPCS

## 2021-12-16 PROCEDURE — 36415 COLL VENOUS BLD VENIPUNCTURE: CPT

## 2021-12-16 PROCEDURE — 87798 DETECT AGENT NOS DNA AMP: CPT

## 2021-12-16 PROCEDURE — U0005 INFEC AGEN DETEC AMPLI PROBE: HCPCS

## 2021-12-16 PROCEDURE — 85025 COMPLETE CBC W/AUTO DIFF WBC: CPT

## 2021-12-16 PROCEDURE — 87581 M.PNEUMON DNA AMP PROBE: CPT

## 2021-12-30 ENCOUNTER — HOSPITAL ENCOUNTER (OUTPATIENT)
Age: 46
End: 2021-12-30
Payer: COMMERCIAL

## 2021-12-30 DIAGNOSIS — Z13.820: Primary | ICD-10-CM

## 2021-12-30 PROCEDURE — 77080 DXA BONE DENSITY AXIAL: CPT

## 2022-01-03 ENCOUNTER — HOSPITAL ENCOUNTER (OUTPATIENT)
Age: 47
End: 2022-01-03
Payer: COMMERCIAL

## 2022-01-03 DIAGNOSIS — U07.1: Primary | ICD-10-CM

## 2022-01-03 PROCEDURE — U0003 INFECTIOUS AGENT DETECTION BY NUCLEIC ACID (DNA OR RNA); SEVERE ACUTE RESPIRATORY SYNDROME CORONAVIRUS 2 (SARS-COV-2) (CORONAVIRUS DISEASE [COVID-19]), AMPLIFIED PROBE TECHNIQUE, MAKING USE OF HIGH THROUGHPUT TECHNOLOGIES AS DESCRIBED BY CMS-2020-01-R: HCPCS

## 2022-01-03 PROCEDURE — U0005 INFEC AGEN DETEC AMPLI PROBE: HCPCS

## 2022-01-03 PROCEDURE — C9803 HOPD COVID-19 SPEC COLLECT: HCPCS

## 2022-01-22 ENCOUNTER — HOSPITAL ENCOUNTER (OUTPATIENT)
Age: 47
End: 2022-01-22
Payer: COMMERCIAL

## 2022-01-22 DIAGNOSIS — R63.5: ICD-10-CM

## 2022-01-22 DIAGNOSIS — N91.2: ICD-10-CM

## 2022-01-22 DIAGNOSIS — M81.0: Primary | ICD-10-CM

## 2022-01-22 DIAGNOSIS — E65: ICD-10-CM

## 2022-01-27 ENCOUNTER — HOSPITAL ENCOUNTER (OUTPATIENT)
Age: 47
End: 2022-01-27
Payer: COMMERCIAL

## 2022-01-27 DIAGNOSIS — E65: ICD-10-CM

## 2022-01-27 DIAGNOSIS — N91.2: ICD-10-CM

## 2022-01-27 DIAGNOSIS — R63.5: Primary | ICD-10-CM

## 2022-01-27 DIAGNOSIS — M81.0: ICD-10-CM

## 2022-02-28 ENCOUNTER — HOSPITAL ENCOUNTER (OUTPATIENT)
Age: 47
End: 2022-02-28
Payer: COMMERCIAL

## 2022-02-28 DIAGNOSIS — R94.7: Primary | ICD-10-CM

## 2022-04-21 ENCOUNTER — HOSPITAL ENCOUNTER (OUTPATIENT)
Age: 47
End: 2022-04-21
Payer: COMMERCIAL

## 2022-04-21 DIAGNOSIS — Z20.822: Primary | ICD-10-CM

## 2022-04-21 DIAGNOSIS — J02.9: ICD-10-CM

## 2022-04-21 LAB
HCT VFR BLD CALC: 40.8 % (ref 37–47)
HGB BLD-MCNC: 13.5 G/DL (ref 12.2–16.2)
MCHC RBC-ENTMCNC: 33.1 G/DL (ref 31.8–35.4)
MCV RBC: 92.2 FL (ref 81–99)
MEAN CORPUSCULAR HEMOGLOBIN: 30.5 PG (ref 27–31.2)
PLATELET # BLD: 231 K/MM3 (ref 142–424)
RBC # BLD AUTO: 4.43 M/MM3 (ref 4.2–5.4)
WBC # BLD AUTO: 8 K/MM3 (ref 4.8–10.8)

## 2022-04-21 PROCEDURE — 85025 COMPLETE CBC W/AUTO DIFF WBC: CPT

## 2022-04-21 PROCEDURE — 36415 COLL VENOUS BLD VENIPUNCTURE: CPT

## 2022-04-21 PROCEDURE — U0005 INFEC AGEN DETEC AMPLI PROBE: HCPCS

## 2022-04-21 PROCEDURE — 87430 STREP A AG IA: CPT

## 2022-04-21 PROCEDURE — U0003 INFECTIOUS AGENT DETECTION BY NUCLEIC ACID (DNA OR RNA); SEVERE ACUTE RESPIRATORY SYNDROME CORONAVIRUS 2 (SARS-COV-2) (CORONAVIRUS DISEASE [COVID-19]), AMPLIFIED PROBE TECHNIQUE, MAKING USE OF HIGH THROUGHPUT TECHNOLOGIES AS DESCRIBED BY CMS-2020-01-R: HCPCS

## 2022-04-21 PROCEDURE — C9803 HOPD COVID-19 SPEC COLLECT: HCPCS

## 2022-05-25 ENCOUNTER — HOSPITAL ENCOUNTER (OUTPATIENT)
Age: 47
End: 2022-05-25
Payer: COMMERCIAL

## 2022-05-25 DIAGNOSIS — Z20.822: Primary | ICD-10-CM

## 2022-05-25 DIAGNOSIS — J02.9: ICD-10-CM

## 2022-05-25 LAB
HCT VFR BLD CALC: 43.2 % (ref 37–47)
HGB BLD-MCNC: 14.3 G/DL (ref 12.2–16.2)
MCHC RBC-ENTMCNC: 33.1 G/DL (ref 31.8–35.4)
MCV RBC: 88.9 FL (ref 81–99)
MEAN CORPUSCULAR HEMOGLOBIN: 29.4 PG (ref 27–31.2)
PLATELET # BLD: 304 K/MM3 (ref 142–424)
RBC # BLD AUTO: 4.86 M/MM3 (ref 4.2–5.4)
WBC # BLD AUTO: 9.6 K/MM3 (ref 4.8–10.8)

## 2022-05-25 PROCEDURE — U0005 INFEC AGEN DETEC AMPLI PROBE: HCPCS

## 2022-05-25 PROCEDURE — C9803 HOPD COVID-19 SPEC COLLECT: HCPCS

## 2022-05-25 PROCEDURE — 87276 INFLUENZA A AG IF: CPT

## 2022-05-25 PROCEDURE — 87275 INFLUENZA B AG IF: CPT

## 2022-05-25 PROCEDURE — 85025 COMPLETE CBC W/AUTO DIFF WBC: CPT

## 2022-05-25 PROCEDURE — U0003 INFECTIOUS AGENT DETECTION BY NUCLEIC ACID (DNA OR RNA); SEVERE ACUTE RESPIRATORY SYNDROME CORONAVIRUS 2 (SARS-COV-2) (CORONAVIRUS DISEASE [COVID-19]), AMPLIFIED PROBE TECHNIQUE, MAKING USE OF HIGH THROUGHPUT TECHNOLOGIES AS DESCRIBED BY CMS-2020-01-R: HCPCS

## 2022-05-25 PROCEDURE — 87430 STREP A AG IA: CPT

## 2022-11-08 ENCOUNTER — HOSPITAL ENCOUNTER (OUTPATIENT)
Age: 47
End: 2022-11-08
Payer: COMMERCIAL

## 2022-11-08 DIAGNOSIS — R53.83: Primary | ICD-10-CM

## 2022-11-08 PROCEDURE — 36415 COLL VENOUS BLD VENIPUNCTURE: CPT

## 2023-01-02 ENCOUNTER — HOSPITAL ENCOUNTER (OUTPATIENT)
Age: 48
End: 2023-01-02
Payer: COMMERCIAL

## 2023-01-02 DIAGNOSIS — R53.83: Primary | ICD-10-CM

## 2023-01-02 LAB
FT4I SERPL CALC-MCNC: 2.5 UG/DL (ref 5.93–13.13)
T3RU NFR SERPL: 27 % (ref 23.5–40.5)
T4 (THYROXINE): 9.2 UG/DL (ref 5.53–11)
TSH SERPL-ACNC: 2.04 UIU/ML (ref 0.47–4.68)

## 2023-01-02 PROCEDURE — 84479 ASSAY OF THYROID (T3 OR T4): CPT

## 2023-01-02 PROCEDURE — 84436 ASSAY OF TOTAL THYROXINE: CPT

## 2023-01-02 PROCEDURE — 84443 ASSAY THYROID STIM HORMONE: CPT

## 2023-01-02 PROCEDURE — 36415 COLL VENOUS BLD VENIPUNCTURE: CPT

## 2023-02-27 ENCOUNTER — HOSPITAL ENCOUNTER (OUTPATIENT)
Age: 48
End: 2023-02-27
Payer: COMMERCIAL

## 2023-02-27 DIAGNOSIS — B96.29: ICD-10-CM

## 2023-02-27 DIAGNOSIS — E34.9: Primary | ICD-10-CM

## 2023-02-27 DIAGNOSIS — N39.0: ICD-10-CM

## 2023-02-27 PROCEDURE — 83001 ASSAY OF GONADOTROPIN (FSH): CPT

## 2023-02-27 PROCEDURE — 83002 ASSAY OF GONADOTROPIN (LH): CPT

## 2023-02-27 PROCEDURE — 36415 COLL VENOUS BLD VENIPUNCTURE: CPT

## 2023-02-27 PROCEDURE — 87088 URINE BACTERIA CULTURE: CPT

## 2023-02-27 PROCEDURE — 87086 URINE CULTURE/COLONY COUNT: CPT

## 2023-02-27 PROCEDURE — 87186 SC STD MICRODIL/AGAR DIL: CPT

## 2023-02-28 LAB
FSH: 10.6 MIU/ML
LH: 11.5 MIU/ML

## 2023-03-25 ENCOUNTER — HOSPITAL ENCOUNTER (OUTPATIENT)
Age: 48
End: 2023-03-25
Payer: COMMERCIAL

## 2023-03-25 DIAGNOSIS — R30.0: Primary | ICD-10-CM

## 2023-03-25 PROCEDURE — 87086 URINE CULTURE/COLONY COUNT: CPT

## 2023-03-31 ENCOUNTER — HOSPITAL ENCOUNTER (OUTPATIENT)
Age: 48
End: 2023-03-31
Payer: COMMERCIAL

## 2023-03-31 DIAGNOSIS — M85.89: ICD-10-CM

## 2023-03-31 DIAGNOSIS — Z13.820: Primary | ICD-10-CM

## 2023-03-31 PROCEDURE — 77080 DXA BONE DENSITY AXIAL: CPT

## 2023-04-21 ENCOUNTER — HOSPITAL ENCOUNTER (OUTPATIENT)
Dept: HOSPITAL 22 - RT | Age: 48
End: 2023-04-21
Payer: COMMERCIAL

## 2023-04-21 DIAGNOSIS — R06.83: ICD-10-CM

## 2023-04-21 DIAGNOSIS — G47.9: ICD-10-CM

## 2023-04-21 DIAGNOSIS — G47.33: Primary | ICD-10-CM

## 2023-04-21 DIAGNOSIS — E66.9: ICD-10-CM

## 2023-04-21 PROCEDURE — G0399 HOME SLEEP TEST/TYPE 3 PORTA: HCPCS

## 2023-05-24 ENCOUNTER — HOSPITAL ENCOUNTER (OUTPATIENT)
Age: 48
End: 2023-05-24
Payer: COMMERCIAL

## 2023-05-24 DIAGNOSIS — R51.9: ICD-10-CM

## 2023-05-24 DIAGNOSIS — M62.81: ICD-10-CM

## 2023-05-24 DIAGNOSIS — R42: Primary | ICD-10-CM

## 2023-05-24 DIAGNOSIS — R60.0: ICD-10-CM

## 2023-05-24 LAB
ALBUMIN LEVEL: 4.8 G/DL (ref 3.5–5)
ALBUMIN/GLOB SERPL: 1.6 {RATIO} (ref 1.1–1.8)
ALP ISO SERPL-ACNC: 89 U/L (ref 38–126)
ALT SERPLBLD-CCNC: 28 U/L (ref 12–78)
ANION GAP SERPL CALC-SCNC: 19.7 MEQ/L (ref 5–15)
AST SERPL QL: 43 U/L (ref 14–36)
BILIRUBIN,TOTAL: 0.7 MG/DL (ref 0.2–1.3)
BUN SERPL-MCNC: 15 MG/DL (ref 7–17)
CALCIUM SPEC-MCNC: 9.9 MG/DL (ref 8.4–10.2)
CHLORIDE SPEC-SCNC: 97 MMOL/L (ref 98–107)
CK SERPL-CCNC: 71 U/L (ref 30–135)
CO2 SERPL-SCNC: 24 MMOL/L (ref 22–30)
CREAT BLD-SCNC: 0.7 MG/DL (ref 0.52–1.04)
ESTIMATED GLOMERULAR FILT RATE: 89 ML/MIN (ref 60–?)
GFR (AFRICAN AMERICAN): 108 ML/MIN (ref 60–?)
GLOBULIN SER CALC-MCNC: 3 G/DL (ref 1.3–3.2)
GLUCOSE: 87 MG/DL (ref 74–100)
HCT VFR BLD CALC: 47.2 % (ref 37–47)
HGB BLD-MCNC: 15.4 G/DL (ref 12.2–16.2)
MAGNESIUM: 1.9 MG/DL (ref 1.6–2.3)
MCHC RBC-ENTMCNC: 32.7 G/DL (ref 31.8–35.4)
MCV RBC: 89.3 FL (ref 81–99)
MEAN CORPUSCULAR HEMOGLOBIN: 29.2 PG (ref 27–31.2)
NT PRO BRAIN NATRIURETIC PEP.: < 20 PG/ML (ref 0–125)
PLATELET # BLD: 293 K/MM3 (ref 142–424)
POTASSIUM: 3.7 MMOL/L (ref 3.5–5.1)
PROT SERPL-MCNC: 7.8 G/DL (ref 6.3–8.2)
RBC # BLD AUTO: 5.28 M/MM3 (ref 4.2–5.4)
SODIUM SPEC-SCNC: 137 MMOL/L (ref 136–145)
TSH SERPL-ACNC: 2.62 UIU/ML (ref 0.47–4.68)
VITAMIN B12: 222 PG/ML (ref 239–931)
WBC # BLD AUTO: 8.7 K/MM3 (ref 4.8–10.8)

## 2023-05-24 PROCEDURE — 83880 ASSAY OF NATRIURETIC PEPTIDE: CPT

## 2023-05-24 PROCEDURE — 83735 ASSAY OF MAGNESIUM: CPT

## 2023-05-24 PROCEDURE — 82607 VITAMIN B-12: CPT

## 2023-05-24 PROCEDURE — 85025 COMPLETE CBC W/AUTO DIFF WBC: CPT

## 2023-05-24 PROCEDURE — 80053 COMPREHEN METABOLIC PANEL: CPT

## 2023-05-24 PROCEDURE — 36415 COLL VENOUS BLD VENIPUNCTURE: CPT

## 2023-05-24 PROCEDURE — 84443 ASSAY THYROID STIM HORMONE: CPT

## 2023-05-24 PROCEDURE — 82550 ASSAY OF CK (CPK): CPT

## 2023-05-24 PROCEDURE — 85651 RBC SED RATE NONAUTOMATED: CPT

## 2023-06-20 ENCOUNTER — HOSPITAL ENCOUNTER (OUTPATIENT)
Age: 48
End: 2023-06-20
Payer: COMMERCIAL

## 2023-06-20 DIAGNOSIS — R60.0: Primary | ICD-10-CM

## 2023-06-20 PROCEDURE — 93306 TTE W/DOPPLER COMPLETE: CPT

## 2023-10-02 ENCOUNTER — HOSPITAL ENCOUNTER (OUTPATIENT)
Age: 48
End: 2023-10-02
Payer: COMMERCIAL

## 2023-10-02 DIAGNOSIS — L65.9: Primary | ICD-10-CM

## 2023-10-02 LAB
ALBUMIN LEVEL: 4.7 G/DL (ref 3.5–5)
ALBUMIN/GLOB SERPL: 1.5 {RATIO} (ref 1.1–1.8)
ALP ISO SERPL-ACNC: 82 U/L (ref 38–126)
ALT SERPLBLD-CCNC: 27 U/L (ref 12–78)
ANION GAP SERPL CALC-SCNC: 12.6 MEQ/L (ref 5–15)
AST SERPL QL: 27 U/L (ref 14–36)
BILIRUBIN,TOTAL: 0.5 MG/DL (ref 0.2–1.3)
BUN SERPL-MCNC: 16 MG/DL (ref 7–17)
CALCIUM SPEC-MCNC: 9.3 MG/DL (ref 8.4–10.2)
CHLORIDE SPEC-SCNC: 104 MMOL/L (ref 98–107)
CO2 SERPL-SCNC: 26 MMOL/L (ref 22–30)
CREAT BLD-SCNC: 0.8 MG/DL (ref 0.52–1.04)
ESTIMATED GLOMERULAR FILT RATE: 77 ML/MIN (ref 60–?)
GFR (AFRICAN AMERICAN): 93 ML/MIN (ref 60–?)
GLOBULIN SER CALC-MCNC: 3.1 G/DL (ref 1.3–3.2)
GLUCOSE: 90 MG/DL (ref 74–100)
HCT VFR BLD CALC: 46.6 % (ref 37–47)
HGB BLD-MCNC: 14.9 G/DL (ref 12.2–16.2)
MCHC RBC-ENTMCNC: 32.1 G/DL (ref 31.8–35.4)
MCV RBC: 91.2 FL (ref 81–99)
MEAN CORPUSCULAR HEMOGLOBIN: 29.3 PG (ref 27–31.2)
PLATELET # BLD: 240 K/MM3 (ref 142–424)
POTASSIUM: 3.6 MMOL/L (ref 3.5–5.1)
PROT SERPL-MCNC: 7.8 G/DL (ref 6.3–8.2)
RBC # BLD AUTO: 5.1 M/MM3 (ref 4.2–5.4)
SODIUM SPEC-SCNC: 139 MMOL/L (ref 136–145)
TSH SERPL-ACNC: 2.56 UIU/ML (ref 0.47–4.68)
WBC # BLD AUTO: 7.5 K/MM3 (ref 4.8–10.8)

## 2023-10-02 PROCEDURE — 85025 COMPLETE CBC W/AUTO DIFF WBC: CPT

## 2023-10-02 PROCEDURE — 80053 COMPREHEN METABOLIC PANEL: CPT

## 2023-10-02 PROCEDURE — 36415 COLL VENOUS BLD VENIPUNCTURE: CPT

## 2023-10-02 PROCEDURE — 84443 ASSAY THYROID STIM HORMONE: CPT

## 2024-07-03 ENCOUNTER — HOSPITAL ENCOUNTER (EMERGENCY)
Age: 49
Discharge: HOME | End: 2024-07-03
Payer: COMMERCIAL

## 2024-07-03 VITALS
HEART RATE: 86 BPM | SYSTOLIC BLOOD PRESSURE: 121 MMHG | TEMPERATURE: 98.3 F | OXYGEN SATURATION: 99 % | DIASTOLIC BLOOD PRESSURE: 69 MMHG | RESPIRATION RATE: 20 BRPM

## 2024-07-03 VITALS
HEART RATE: 86 BPM | RESPIRATION RATE: 20 BRPM | SYSTOLIC BLOOD PRESSURE: 121 MMHG | TEMPERATURE: 98.3 F | OXYGEN SATURATION: 99 % | DIASTOLIC BLOOD PRESSURE: 69 MMHG

## 2024-07-03 VITALS — BODY MASS INDEX: 36.6 KG/M2

## 2024-07-03 DIAGNOSIS — H66.93: Primary | ICD-10-CM

## 2024-07-03 DIAGNOSIS — H92.03: ICD-10-CM

## 2024-07-03 PROCEDURE — G0463 HOSPITAL OUTPT CLINIC VISIT: HCPCS

## 2024-07-03 PROCEDURE — 99212 OFFICE O/P EST SF 10 MIN: CPT

## 2024-07-03 PROCEDURE — 99204 OFFICE O/P NEW MOD 45 MIN: CPT

## 2024-07-10 ENCOUNTER — HOSPITAL ENCOUNTER (EMERGENCY)
Age: 49
Discharge: HOME | End: 2024-07-10
Payer: COMMERCIAL

## 2024-07-10 VITALS
OXYGEN SATURATION: 99 % | RESPIRATION RATE: 18 BRPM | TEMPERATURE: 97.88 F | SYSTOLIC BLOOD PRESSURE: 123 MMHG | HEART RATE: 85 BPM | DIASTOLIC BLOOD PRESSURE: 79 MMHG

## 2024-07-10 VITALS
OXYGEN SATURATION: 98 % | HEART RATE: 74 BPM | RESPIRATION RATE: 16 BRPM | DIASTOLIC BLOOD PRESSURE: 82 MMHG | SYSTOLIC BLOOD PRESSURE: 140 MMHG | TEMPERATURE: 97.88 F

## 2024-07-10 VITALS — BODY MASS INDEX: 37.9 KG/M2

## 2024-07-10 DIAGNOSIS — H66.91: Primary | ICD-10-CM

## 2024-07-10 DIAGNOSIS — H92.01: ICD-10-CM

## 2024-07-10 PROCEDURE — 96372 THER/PROPH/DIAG INJ SC/IM: CPT

## 2024-07-10 PROCEDURE — 99283 EMERGENCY DEPT VISIT LOW MDM: CPT

## 2024-09-03 ENCOUNTER — HOSPITAL ENCOUNTER (EMERGENCY)
Age: 49
Discharge: HOME | End: 2024-09-03
Payer: COMMERCIAL

## 2024-09-03 VITALS
HEART RATE: 91 BPM | RESPIRATION RATE: 19 BRPM | OXYGEN SATURATION: 100 % | SYSTOLIC BLOOD PRESSURE: 133 MMHG | TEMPERATURE: 98 F | DIASTOLIC BLOOD PRESSURE: 91 MMHG

## 2024-09-03 VITALS
OXYGEN SATURATION: 99 % | HEART RATE: 83 BPM | TEMPERATURE: 98.2 F | RESPIRATION RATE: 17 BRPM | SYSTOLIC BLOOD PRESSURE: 123 MMHG | DIASTOLIC BLOOD PRESSURE: 80 MMHG

## 2024-09-03 VITALS — BODY MASS INDEX: 39.4 KG/M2

## 2024-09-03 VITALS
SYSTOLIC BLOOD PRESSURE: 121 MMHG | RESPIRATION RATE: 18 BRPM | DIASTOLIC BLOOD PRESSURE: 68 MMHG | HEART RATE: 81 BPM | OXYGEN SATURATION: 100 %

## 2024-09-03 DIAGNOSIS — R30.0: ICD-10-CM

## 2024-09-03 DIAGNOSIS — R42: Primary | ICD-10-CM

## 2024-09-03 DIAGNOSIS — R19.7: ICD-10-CM

## 2024-09-03 DIAGNOSIS — R47.81: ICD-10-CM

## 2024-09-03 DIAGNOSIS — E86.0: ICD-10-CM

## 2024-09-03 LAB
ALBUMIN LEVEL: 4.8 G/DL (ref 3.5–5)
ALBUMIN/GLOB SERPL: 1.7 {RATIO} (ref 1.1–1.8)
ALP ISO SERPL-ACNC: 57 U/L (ref 38–126)
ALT SERPLBLD-CCNC: 43 U/L (ref 12–78)
ANION GAP SERPL CALC-SCNC: 8.8 MEQ/L (ref 5–15)
AST SERPL QL: 60 U/L (ref 14–36)
BACTERIA URNS QL MICRO: (no result) /LPF
BILIRUBIN,TOTAL: 1.5 MG/DL (ref 0.2–1.3)
BUN SERPL-MCNC: 18 MG/DL (ref 7–17)
CALCIUM SPEC-MCNC: 9 MG/DL (ref 8.4–10.2)
CHLORIDE SPEC-SCNC: 106 MMOL/L (ref 98–107)
CO2 SERPL-SCNC: 25 MMOL/L (ref 22–30)
COLOR UR: YELLOW
CREAT BLD-SCNC: 0.7 MG/DL (ref 0.52–1.04)
CREATININE CLEARANCE ESTIMATED: 160 ML/MIN (ref 50–200)
ESTIMATED GLOMERULAR FILT RATE: 89 ML/MIN (ref 60–?)
GFR (AFRICAN AMERICAN): 108 ML/MIN (ref 60–?)
GLOBULIN SER CALC-MCNC: 2.9 G/DL (ref 1.3–3.2)
GLUCOSE: 92 MG/DL (ref 74–100)
HCT VFR BLD CALC: 42.3 % (ref 37–47)
HGB BLD-MCNC: 13.5 G/DL (ref 12.2–16.2)
KETONES UR STRIP.AUTO-MCNC: (no result) MG/DL
MAGNESIUM: 2 MG/DL (ref 1.6–2.3)
MCHC RBC-ENTMCNC: 31.9 G/DL (ref 31.8–35.4)
MCV RBC: 93.1 FL (ref 81–99)
MEAN CORPUSCULAR HEMOGLOBIN: 29.7 PG (ref 27–31.2)
MICRO URNS: (no result)
MUCOUS THREADS URNS QL MICRO: (no result) /LPF
PH UR: 6 [PH] (ref 5–8.5)
PLATELET # BLD: 246 K/MM3 (ref 142–424)
POTASSIUM: 4.8 MMOL/L (ref 3.5–5.1)
PROT SERPL-MCNC: 7.7 G/DL (ref 6.3–8.2)
RBC # BLD AUTO: 4.54 M/MM3 (ref 4.2–5.4)
SODIUM SPEC-SCNC: 135 MMOL/L (ref 136–145)
SP GR UR: >= 1.03 (ref 1–1.03)
UROBILINOGEN UR QL: 0.2 EU/DL
WBC # BLD AUTO: 5.5 K/MM3 (ref 4.8–10.8)

## 2024-09-03 PROCEDURE — 85025 COMPLETE CBC W/AUTO DIFF WBC: CPT

## 2024-09-03 PROCEDURE — 80053 COMPREHEN METABOLIC PANEL: CPT

## 2024-09-03 PROCEDURE — 99285 EMERGENCY DEPT VISIT HI MDM: CPT

## 2024-09-03 PROCEDURE — 87086 URINE CULTURE/COLONY COUNT: CPT

## 2024-09-03 PROCEDURE — 93005 ELECTROCARDIOGRAM TRACING: CPT

## 2024-09-03 PROCEDURE — 87088 URINE BACTERIA CULTURE: CPT

## 2024-09-03 PROCEDURE — 96360 HYDRATION IV INFUSION INIT: CPT

## 2024-09-03 PROCEDURE — 83735 ASSAY OF MAGNESIUM: CPT

## 2024-09-03 PROCEDURE — 81001 URINALYSIS AUTO W/SCOPE: CPT

## 2024-09-03 PROCEDURE — 87186 SC STD MICRODIL/AGAR DIL: CPT

## 2024-09-05 ENCOUNTER — HOSPITAL ENCOUNTER (EMERGENCY)
Age: 49
Discharge: HOME | End: 2024-09-05
Payer: COMMERCIAL

## 2024-09-05 VITALS — BODY MASS INDEX: 38.7 KG/M2

## 2024-09-05 VITALS
OXYGEN SATURATION: 98 % | HEART RATE: 95 BPM | SYSTOLIC BLOOD PRESSURE: 148 MMHG | DIASTOLIC BLOOD PRESSURE: 92 MMHG | RESPIRATION RATE: 20 BRPM | TEMPERATURE: 98.2 F

## 2024-09-05 VITALS
TEMPERATURE: 98.2 F | RESPIRATION RATE: 20 BRPM | DIASTOLIC BLOOD PRESSURE: 92 MMHG | SYSTOLIC BLOOD PRESSURE: 148 MMHG | OXYGEN SATURATION: 98 % | HEART RATE: 95 BPM

## 2024-09-05 DIAGNOSIS — Z20.822: ICD-10-CM

## 2024-09-05 DIAGNOSIS — R07.0: ICD-10-CM

## 2024-09-05 DIAGNOSIS — R51.9: Primary | ICD-10-CM

## 2024-09-05 DIAGNOSIS — R21: ICD-10-CM

## 2024-09-05 PROCEDURE — 99212 OFFICE O/P EST SF 10 MIN: CPT

## 2024-09-05 PROCEDURE — 99213 OFFICE O/P EST LOW 20 MIN: CPT

## 2024-09-05 PROCEDURE — G0463 HOSPITAL OUTPT CLINIC VISIT: HCPCS

## 2024-09-05 PROCEDURE — 87635 SARS-COV-2 COVID-19 AMP PRB: CPT

## 2024-09-05 PROCEDURE — 87880 STREP A ASSAY W/OPTIC: CPT

## 2024-09-17 ENCOUNTER — HOSPITAL ENCOUNTER (OUTPATIENT)
Dept: HOSPITAL 22 - LAB | Age: 49
Discharge: HOME | End: 2024-09-17
Payer: COMMERCIAL

## 2024-09-17 DIAGNOSIS — E55.9: Primary | ICD-10-CM

## 2024-09-17 DIAGNOSIS — E53.8: ICD-10-CM

## 2024-09-17 LAB
25-OH VITAMIN D, TOTAL: 111 NG/ML (ref 30–100)
VITAMIN B12: > 1000 PG/ML (ref 239–931)

## 2024-09-17 PROCEDURE — 82607 VITAMIN B-12: CPT

## 2024-09-17 PROCEDURE — 82306 VITAMIN D 25 HYDROXY: CPT

## 2024-09-17 PROCEDURE — 36415 COLL VENOUS BLD VENIPUNCTURE: CPT

## 2024-10-14 ENCOUNTER — HOSPITAL ENCOUNTER (OUTPATIENT)
Dept: HOSPITAL 22 - LAB | Age: 49
Discharge: HOME | End: 2024-10-14
Payer: COMMERCIAL

## 2024-10-14 DIAGNOSIS — N91.2: Primary | ICD-10-CM

## 2024-10-14 PROCEDURE — 82670 ASSAY OF TOTAL ESTRADIOL: CPT

## 2024-10-14 PROCEDURE — 36415 COLL VENOUS BLD VENIPUNCTURE: CPT

## 2024-10-14 PROCEDURE — 83001 ASSAY OF GONADOTROPIN (FSH): CPT

## 2024-10-14 PROCEDURE — 83002 ASSAY OF GONADOTROPIN (LH): CPT

## 2024-10-16 LAB
FSH: 114 MIU/ML
LH: 63.3 MIU/ML

## 2024-12-08 ENCOUNTER — HOSPITAL ENCOUNTER (EMERGENCY)
Dept: HOSPITAL 22 - UTC | Age: 49
Discharge: HOME | End: 2024-12-08
Payer: COMMERCIAL

## 2024-12-08 VITALS — HEART RATE: 100 BPM | DIASTOLIC BLOOD PRESSURE: 75 MMHG | SYSTOLIC BLOOD PRESSURE: 132 MMHG | OXYGEN SATURATION: 100 %

## 2024-12-08 VITALS — HEART RATE: 124 BPM | DIASTOLIC BLOOD PRESSURE: 68 MMHG | OXYGEN SATURATION: 96 % | SYSTOLIC BLOOD PRESSURE: 130 MMHG

## 2024-12-08 VITALS
SYSTOLIC BLOOD PRESSURE: 138 MMHG | RESPIRATION RATE: 18 BRPM | DIASTOLIC BLOOD PRESSURE: 80 MMHG | HEART RATE: 107 BPM | OXYGEN SATURATION: 97 % | TEMPERATURE: 98.7 F

## 2024-12-08 VITALS
HEART RATE: 121 BPM | TEMPERATURE: 98.24 F | RESPIRATION RATE: 18 BRPM | OXYGEN SATURATION: 99 % | DIASTOLIC BLOOD PRESSURE: 87 MMHG | SYSTOLIC BLOOD PRESSURE: 123 MMHG

## 2024-12-08 VITALS — BODY MASS INDEX: 31.6 KG/M2

## 2024-12-08 VITALS — HEART RATE: 100 BPM | SYSTOLIC BLOOD PRESSURE: 138 MMHG | DIASTOLIC BLOOD PRESSURE: 80 MMHG | OXYGEN SATURATION: 98 %

## 2024-12-08 DIAGNOSIS — R11.0: ICD-10-CM

## 2024-12-08 DIAGNOSIS — M79.10: ICD-10-CM

## 2024-12-08 DIAGNOSIS — R06.02: Primary | ICD-10-CM

## 2024-12-08 DIAGNOSIS — R09.81: ICD-10-CM

## 2024-12-08 DIAGNOSIS — J02.9: ICD-10-CM

## 2024-12-08 DIAGNOSIS — R05.9: ICD-10-CM

## 2024-12-08 PROCEDURE — 87636 SARSCOV2 & INF A&B AMP PRB: CPT

## 2024-12-08 PROCEDURE — 99283 EMERGENCY DEPT VISIT LOW MDM: CPT

## 2024-12-08 PROCEDURE — 71046 X-RAY EXAM CHEST 2 VIEWS: CPT

## 2024-12-08 RX ADMIN — ONDANSETRON 4 MG: 4 TABLET, ORALLY DISINTEGRATING ORAL at 11:18

## 2024-12-08 RX ADMIN — IPRATROPIUM BROMIDE AND ALBUTEROL SULFATE 9 ML: .5; 3 SOLUTION RESPIRATORY (INHALATION) at 11:18

## 2024-12-08 RX ADMIN — DEXAMETHASONE 10 MG: 4 TABLET ORAL at 11:19

## 2025-01-01 ENCOUNTER — HOSPITAL ENCOUNTER (EMERGENCY)
Age: 50
Discharge: HOME | End: 2025-01-01
Payer: COMMERCIAL

## 2025-01-01 VITALS
RESPIRATION RATE: 18 BRPM | HEART RATE: 91 BPM | DIASTOLIC BLOOD PRESSURE: 111 MMHG | TEMPERATURE: 98.06 F | SYSTOLIC BLOOD PRESSURE: 130 MMHG

## 2025-01-01 VITALS
RESPIRATION RATE: 20 BRPM | TEMPERATURE: 98.1 F | HEART RATE: 91 BPM | DIASTOLIC BLOOD PRESSURE: 111 MMHG | OXYGEN SATURATION: 95 % | SYSTOLIC BLOOD PRESSURE: 130 MMHG

## 2025-01-01 VITALS — BODY MASS INDEX: 31.8 KG/M2

## 2025-01-01 DIAGNOSIS — J01.90: Primary | ICD-10-CM

## 2025-01-01 DIAGNOSIS — R05.9: ICD-10-CM

## 2025-01-01 DIAGNOSIS — R09.89: ICD-10-CM

## 2025-01-01 DIAGNOSIS — R51.9: ICD-10-CM

## 2025-01-01 DIAGNOSIS — R63.5: ICD-10-CM

## 2025-01-01 DIAGNOSIS — J45.909: ICD-10-CM

## 2025-01-01 LAB
BILIRUBIN,URINE: (no result)
PH,URINE: 5.5 (ref 5–8.5)
PROTEIN,URINE: (no result)
SPECIFIC GRAVITY, URINE: 1.03 (ref 1–1.03)
UROBILINOGEN,URINE: 0.2 EU/DL

## 2025-01-01 PROCEDURE — 87636 SARSCOV2 & INF A&B AMP PRB: CPT

## 2025-01-01 PROCEDURE — 87086 URINE CULTURE/COLONY COUNT: CPT

## 2025-01-01 PROCEDURE — 99212 OFFICE O/P EST SF 10 MIN: CPT

## 2025-01-01 PROCEDURE — G0381 LEV 2 HOSP TYPE B ED VISIT: HCPCS

## 2025-01-01 PROCEDURE — 81003 URINALYSIS AUTO W/O SCOPE: CPT

## 2025-02-05 ENCOUNTER — HOSPITAL ENCOUNTER (OUTPATIENT)
Dept: HOSPITAL 22 - RAD | Age: 50
Discharge: HOME | End: 2025-02-05
Payer: COMMERCIAL

## 2025-02-05 DIAGNOSIS — N95.0: Primary | ICD-10-CM

## 2025-02-05 DIAGNOSIS — N95.1: ICD-10-CM

## 2025-02-05 PROCEDURE — 76830 TRANSVAGINAL US NON-OB: CPT
